# Patient Record
Sex: MALE | Race: WHITE | NOT HISPANIC OR LATINO | Employment: FULL TIME | URBAN - METROPOLITAN AREA
[De-identification: names, ages, dates, MRNs, and addresses within clinical notes are randomized per-mention and may not be internally consistent; named-entity substitution may affect disease eponyms.]

---

## 2017-02-23 ENCOUNTER — HOSPITAL ENCOUNTER (OUTPATIENT)
Dept: SLEEP MEDICINE | Facility: HOSPITAL | Age: 32
Discharge: HOME | End: 2017-02-23
Attending: NURSE PRACTITIONER
Payer: COMMERCIAL

## 2017-02-23 PROCEDURE — 95810 POLYSOM 6/> YRS 4/> PARAM: CPT

## 2017-03-13 ENCOUNTER — HOSPITAL ENCOUNTER (OUTPATIENT)
Dept: SLEEP MEDICINE | Facility: HOSPITAL | Age: 32
Discharge: HOME | End: 2017-03-13
Attending: NURSE PRACTITIONER
Payer: COMMERCIAL

## 2017-03-13 PROCEDURE — 95811 POLYSOM 6/>YRS CPAP 4/> PARM: CPT

## 2018-10-25 ENCOUNTER — HOSPITAL ENCOUNTER (OUTPATIENT)
Dept: SLEEP MEDICINE | Facility: HOSPITAL | Age: 33
Discharge: HOME | End: 2018-10-25
Attending: NURSE PRACTITIONER
Payer: COMMERCIAL

## 2018-10-25 DIAGNOSIS — G47.33 OBSTRUCTIVE SLEEP APNEA (ADULT) (PEDIATRIC): ICD-10-CM

## 2018-10-25 PROCEDURE — 95811 POLYSOM 6/>YRS CPAP 4/> PARM: CPT

## 2018-10-25 PROCEDURE — 95811 POLYSOM 6/>YRS CPAP 4/> PARM: CPT | Mod: 26,,, | Performed by: INTERNAL MEDICINE

## 2021-04-29 ENCOUNTER — HOSPITAL ENCOUNTER (OUTPATIENT)
Dept: RADIOLOGY | Facility: HOSPITAL | Age: 36
Discharge: HOME | End: 2021-04-29
Attending: PAIN MEDICINE
Payer: COMMERCIAL

## 2021-04-29 DIAGNOSIS — M16.0 ARTHRITIS OF BOTH HIPS: ICD-10-CM

## 2021-04-29 DIAGNOSIS — M54.16 LUMBAR RADICULOPATHY: ICD-10-CM

## 2021-04-29 DIAGNOSIS — M87.850 OTHER OSTEONECROSIS, PELVIS: ICD-10-CM

## 2021-04-29 PROCEDURE — 72100 X-RAY EXAM L-S SPINE 2/3 VWS: CPT | Mod: TC

## 2021-04-29 PROCEDURE — 73521 XR HIPS BILATERAL 2 VIEW INCL AP PELVIS: ICD-10-PCS | Mod: 26,,, | Performed by: RADIOLOGY

## 2021-04-29 PROCEDURE — 73521 X-RAY EXAM HIPS BI 2 VIEWS: CPT | Mod: 26,,, | Performed by: RADIOLOGY

## 2021-04-29 PROCEDURE — 72100 X-RAY EXAM L-S SPINE 2/3 VWS: CPT | Mod: 26,,, | Performed by: RADIOLOGY

## 2021-04-29 PROCEDURE — 72100 XR LUMBAR SPINE AP AND LATERAL: ICD-10-PCS | Mod: 26,,, | Performed by: RADIOLOGY

## 2021-04-29 PROCEDURE — 73521 X-RAY EXAM HIPS BI 2 VIEWS: CPT | Mod: TC

## 2022-03-21 ENCOUNTER — HOSPITAL ENCOUNTER (INPATIENT)
Facility: HOSPITAL | Age: 37
LOS: 6 days | Discharge: HOME | DRG: 603 | End: 2022-03-27
Attending: STUDENT IN AN ORGANIZED HEALTH CARE EDUCATION/TRAINING PROGRAM | Admitting: INTERNAL MEDICINE
Payer: COMMERCIAL

## 2022-03-21 DIAGNOSIS — I47.29 NSVT (NONSUSTAINED VENTRICULAR TACHYCARDIA): ICD-10-CM

## 2022-03-21 DIAGNOSIS — A41.9 SEPSIS: ICD-10-CM

## 2022-03-21 DIAGNOSIS — R50.9 FEVER, UNSPECIFIED FEVER CAUSE: ICD-10-CM

## 2022-03-21 DIAGNOSIS — M79.89 RIGHT LEG SWELLING: ICD-10-CM

## 2022-03-21 DIAGNOSIS — L03.90 CELLULITIS: Primary | ICD-10-CM

## 2022-03-21 DIAGNOSIS — D72.829 LEUKOCYTOSIS, UNSPECIFIED TYPE: ICD-10-CM

## 2022-03-21 DIAGNOSIS — L03.115 CELLULITIS OF RIGHT LOWER LEG: ICD-10-CM

## 2022-03-21 LAB
ALBUMIN SERPL BCP-MCNC: 3.5 G/DL (ref 3.5–5.2)
ALP SERPL-CCNC: 70 U/L (ref 55–135)
ALT SERPL W/O P-5'-P-CCNC: 31 U/L (ref 10–44)
ANION GAP SERPL CALC-SCNC: 12 MMOL/L (ref 8–16)
AST SERPL-CCNC: 20 U/L (ref 10–40)
BASOPHILS # BLD AUTO: 0.02 K/UL (ref 0–0.2)
BASOPHILS NFR BLD: 0.1 % (ref 0–1.9)
BILIRUB SERPL-MCNC: 3.2 MG/DL (ref 0.1–1)
BUN SERPL-MCNC: 10 MG/DL (ref 6–20)
CALCIUM SERPL-MCNC: 8.9 MG/DL (ref 8.7–10.5)
CHLORIDE SERPL-SCNC: 99 MMOL/L (ref 95–110)
CO2 SERPL-SCNC: 24 MMOL/L (ref 23–29)
CREAT SERPL-MCNC: 1 MG/DL (ref 0.5–1.4)
DIFFERENTIAL METHOD: ABNORMAL
EOSINOPHIL # BLD AUTO: 0 K/UL (ref 0–0.5)
EOSINOPHIL NFR BLD: 0 % (ref 0–8)
ERYTHROCYTE [DISTWIDTH] IN BLOOD BY AUTOMATED COUNT: 13.4 % (ref 11.5–14.5)
EST. GFR  (AFRICAN AMERICAN): >60 ML/MIN/1.73 M^2
EST. GFR  (NON AFRICAN AMERICAN): >60 ML/MIN/1.73 M^2
ESTIMATED AVG GLUCOSE: 137 MG/DL (ref 68–131)
GLUCOSE SERPL-MCNC: 199 MG/DL (ref 70–110)
HBA1C MFR BLD: 6.4 % (ref 4–5.6)
HCT VFR BLD AUTO: 42.2 % (ref 40–54)
HGB BLD-MCNC: 13.4 G/DL (ref 14–18)
IMM GRANULOCYTES # BLD AUTO: 0.08 K/UL (ref 0–0.04)
IMM GRANULOCYTES NFR BLD AUTO: 0.5 % (ref 0–0.5)
LACTATE SERPL-SCNC: 1.2 MMOL/L (ref 0.5–2.2)
LACTATE SERPL-SCNC: 1.9 MMOL/L (ref 0.5–2.2)
LYMPHOCYTES # BLD AUTO: 1.1 K/UL (ref 1–4.8)
LYMPHOCYTES NFR BLD: 6.4 % (ref 18–48)
MCH RBC QN AUTO: 27.6 PG (ref 27–31)
MCHC RBC AUTO-ENTMCNC: 31.8 G/DL (ref 32–36)
MCV RBC AUTO: 87 FL (ref 82–98)
MONOCYTES # BLD AUTO: 0.8 K/UL (ref 0.3–1)
MONOCYTES NFR BLD: 4.5 % (ref 4–15)
NEUTROPHILS # BLD AUTO: 15.5 K/UL (ref 1.8–7.7)
NEUTROPHILS NFR BLD: 88.5 % (ref 38–73)
NRBC BLD-RTO: 0 /100 WBC
PLATELET # BLD AUTO: 203 K/UL (ref 150–450)
PMV BLD AUTO: 10.3 FL (ref 9.2–12.9)
POCT GLUCOSE: 122 MG/DL (ref 70–110)
POCT GLUCOSE: 127 MG/DL (ref 70–110)
POTASSIUM SERPL-SCNC: 3.7 MMOL/L (ref 3.5–5.1)
PROCALCITONIN SERPL IA-MCNC: 1.44 NG/ML
PROT SERPL-MCNC: 7.5 G/DL (ref 6–8.4)
RBC # BLD AUTO: 4.85 M/UL (ref 4.6–6.2)
SARS-COV-2 RDRP RESP QL NAA+PROBE: NEGATIVE
SODIUM SERPL-SCNC: 135 MMOL/L (ref 136–145)
WBC # BLD AUTO: 17.5 K/UL (ref 3.9–12.7)

## 2022-03-21 PROCEDURE — 87040 BLOOD CULTURE FOR BACTERIA: CPT | Performed by: STUDENT IN AN ORGANIZED HEALTH CARE EDUCATION/TRAINING PROGRAM

## 2022-03-21 PROCEDURE — 99291 CRITICAL CARE FIRST HOUR: CPT | Mod: 25

## 2022-03-21 PROCEDURE — 93005 ELECTROCARDIOGRAM TRACING: CPT

## 2022-03-21 PROCEDURE — 83036 HEMOGLOBIN GLYCOSYLATED A1C: CPT | Performed by: STUDENT IN AN ORGANIZED HEALTH CARE EDUCATION/TRAINING PROGRAM

## 2022-03-21 PROCEDURE — 96365 THER/PROPH/DIAG IV INF INIT: CPT

## 2022-03-21 PROCEDURE — G0378 HOSPITAL OBSERVATION PER HR: HCPCS

## 2022-03-21 PROCEDURE — 85025 COMPLETE CBC W/AUTO DIFF WBC: CPT | Performed by: STUDENT IN AN ORGANIZED HEALTH CARE EDUCATION/TRAINING PROGRAM

## 2022-03-21 PROCEDURE — 63600175 PHARM REV CODE 636 W HCPCS: Performed by: STUDENT IN AN ORGANIZED HEALTH CARE EDUCATION/TRAINING PROGRAM

## 2022-03-21 PROCEDURE — U0002 COVID-19 LAB TEST NON-CDC: HCPCS | Performed by: STUDENT IN AN ORGANIZED HEALTH CARE EDUCATION/TRAINING PROGRAM

## 2022-03-21 PROCEDURE — 25000003 PHARM REV CODE 250: Performed by: STUDENT IN AN ORGANIZED HEALTH CARE EDUCATION/TRAINING PROGRAM

## 2022-03-21 PROCEDURE — 94760 N-INVAS EAR/PLS OXIMETRY 1: CPT

## 2022-03-21 PROCEDURE — 93010 EKG 12-LEAD: ICD-10-PCS | Mod: ,,, | Performed by: INTERNAL MEDICINE

## 2022-03-21 PROCEDURE — 36415 COLL VENOUS BLD VENIPUNCTURE: CPT | Performed by: STUDENT IN AN ORGANIZED HEALTH CARE EDUCATION/TRAINING PROGRAM

## 2022-03-21 PROCEDURE — 80053 COMPREHEN METABOLIC PANEL: CPT | Performed by: STUDENT IN AN ORGANIZED HEALTH CARE EDUCATION/TRAINING PROGRAM

## 2022-03-21 PROCEDURE — 84145 PROCALCITONIN (PCT): CPT | Performed by: STUDENT IN AN ORGANIZED HEALTH CARE EDUCATION/TRAINING PROGRAM

## 2022-03-21 PROCEDURE — 96375 TX/PRO/DX INJ NEW DRUG ADDON: CPT

## 2022-03-21 PROCEDURE — 93010 ELECTROCARDIOGRAM REPORT: CPT | Mod: ,,, | Performed by: INTERNAL MEDICINE

## 2022-03-21 PROCEDURE — 83605 ASSAY OF LACTIC ACID: CPT | Mod: 91 | Performed by: STUDENT IN AN ORGANIZED HEALTH CARE EDUCATION/TRAINING PROGRAM

## 2022-03-21 RX ORDER — SODIUM CHLORIDE 0.9 % (FLUSH) 0.9 %
10 SYRINGE (ML) INJECTION
Status: DISCONTINUED | OUTPATIENT
Start: 2022-03-21 | End: 2022-03-27 | Stop reason: HOSPADM

## 2022-03-21 RX ORDER — ASPIRIN 325 MG
325 TABLET ORAL DAILY
COMMUNITY

## 2022-03-21 RX ORDER — METFORMIN HYDROCHLORIDE 1000 MG/1
1000 TABLET ORAL NIGHTLY
COMMUNITY

## 2022-03-21 RX ORDER — VANCOMYCIN 2 GRAM/500 ML IN 0.9 % SODIUM CHLORIDE INTRAVENOUS
2000
Status: DISCONTINUED | OUTPATIENT
Start: 2022-03-22 | End: 2022-03-23

## 2022-03-21 RX ORDER — OXYCODONE HYDROCHLORIDE 5 MG/1
5 TABLET ORAL EVERY 4 HOURS PRN
Status: DISCONTINUED | OUTPATIENT
Start: 2022-03-21 | End: 2022-03-27 | Stop reason: HOSPADM

## 2022-03-21 RX ORDER — MORPHINE SULFATE 2 MG/ML
6 INJECTION, SOLUTION INTRAMUSCULAR; INTRAVENOUS
Status: COMPLETED | OUTPATIENT
Start: 2022-03-21 | End: 2022-03-21

## 2022-03-21 RX ORDER — ACETAMINOPHEN 325 MG/1
650 TABLET ORAL
Status: COMPLETED | OUTPATIENT
Start: 2022-03-21 | End: 2022-03-21

## 2022-03-21 RX ORDER — ONDANSETRON 8 MG/1
8 TABLET, ORALLY DISINTEGRATING ORAL EVERY 8 HOURS PRN
Status: DISCONTINUED | OUTPATIENT
Start: 2022-03-21 | End: 2022-03-27 | Stop reason: HOSPADM

## 2022-03-21 RX ORDER — ONDANSETRON 2 MG/ML
4 INJECTION INTRAMUSCULAR; INTRAVENOUS
Status: COMPLETED | OUTPATIENT
Start: 2022-03-21 | End: 2022-03-21

## 2022-03-21 RX ORDER — ROSUVASTATIN CALCIUM 10 MG/1
10 TABLET, COATED ORAL DAILY
COMMUNITY

## 2022-03-21 RX ORDER — GLUCAGON 1 MG
1 KIT INJECTION
Status: DISCONTINUED | OUTPATIENT
Start: 2022-03-21 | End: 2022-03-27 | Stop reason: HOSPADM

## 2022-03-21 RX ORDER — ACETAMINOPHEN 325 MG/1
650 TABLET ORAL EVERY 8 HOURS PRN
Status: DISCONTINUED | OUTPATIENT
Start: 2022-03-21 | End: 2022-03-27 | Stop reason: HOSPADM

## 2022-03-21 RX ORDER — LISINOPRIL 20 MG/1
20 TABLET ORAL DAILY
Status: DISCONTINUED | OUTPATIENT
Start: 2022-03-21 | End: 2022-03-27 | Stop reason: HOSPADM

## 2022-03-21 RX ORDER — IBUPROFEN 600 MG/1
600 TABLET ORAL
Status: COMPLETED | OUTPATIENT
Start: 2022-03-21 | End: 2022-03-21

## 2022-03-21 RX ORDER — TALC
6 POWDER (GRAM) TOPICAL NIGHTLY PRN
Status: DISCONTINUED | OUTPATIENT
Start: 2022-03-21 | End: 2022-03-27 | Stop reason: HOSPADM

## 2022-03-21 RX ORDER — ATORVASTATIN CALCIUM 40 MG/1
40 TABLET, FILM COATED ORAL DAILY
Status: DISCONTINUED | OUTPATIENT
Start: 2022-03-21 | End: 2022-03-27 | Stop reason: HOSPADM

## 2022-03-21 RX ORDER — IBUPROFEN 200 MG
24 TABLET ORAL
Status: DISCONTINUED | OUTPATIENT
Start: 2022-03-21 | End: 2022-03-27 | Stop reason: HOSPADM

## 2022-03-21 RX ORDER — IBUPROFEN 200 MG
16 TABLET ORAL
Status: DISCONTINUED | OUTPATIENT
Start: 2022-03-21 | End: 2022-03-27 | Stop reason: HOSPADM

## 2022-03-21 RX ORDER — CLINDAMYCIN PHOSPHATE 900 MG/50ML
900 INJECTION, SOLUTION INTRAVENOUS
Status: COMPLETED | OUTPATIENT
Start: 2022-03-21 | End: 2022-03-22

## 2022-03-21 RX ORDER — LISINOPRIL 20 MG/1
20 TABLET ORAL DAILY
COMMUNITY

## 2022-03-21 RX ORDER — VANCOMYCIN 2 GRAM/500 ML IN 0.9 % SODIUM CHLORIDE INTRAVENOUS
2000 ONCE
Status: COMPLETED | OUTPATIENT
Start: 2022-03-21 | End: 2022-03-21

## 2022-03-21 RX ORDER — INSULIN ASPART 100 [IU]/ML
1-10 INJECTION, SOLUTION INTRAVENOUS; SUBCUTANEOUS
Status: DISCONTINUED | OUTPATIENT
Start: 2022-03-21 | End: 2022-03-27 | Stop reason: HOSPADM

## 2022-03-21 RX ADMIN — SODIUM CHLORIDE 1000 ML: 0.9 INJECTION, SOLUTION INTRAVENOUS at 11:03

## 2022-03-21 RX ADMIN — ATORVASTATIN CALCIUM 40 MG: 40 TABLET, FILM COATED ORAL at 04:03

## 2022-03-21 RX ADMIN — CLINDAMYCIN IN 5 PERCENT DEXTROSE 900 MG: 18 INJECTION, SOLUTION INTRAVENOUS at 06:03

## 2022-03-21 RX ADMIN — ACETAMINOPHEN 650 MG: 325 TABLET ORAL at 10:03

## 2022-03-21 RX ADMIN — MORPHINE SULFATE 6 MG: 2 INJECTION, SOLUTION INTRAMUSCULAR; INTRAVENOUS at 11:03

## 2022-03-21 RX ADMIN — VANCOMYCIN 2 GRAM/500 ML IN 0.9 % SODIUM CHLORIDE INTRAVENOUS 2000 MG: at 12:03

## 2022-03-21 RX ADMIN — ONDANSETRON HYDROCHLORIDE 4 MG: 2 SOLUTION INTRAMUSCULAR; INTRAVENOUS at 11:03

## 2022-03-21 RX ADMIN — IBUPROFEN 600 MG: 600 TABLET ORAL at 10:03

## 2022-03-21 RX ADMIN — PIPERACILLIN AND TAZOBACTAM 4.5 G: 4; .5 INJECTION, POWDER, LYOPHILIZED, FOR SOLUTION INTRAVENOUS; PARENTERAL at 04:03

## 2022-03-21 RX ADMIN — LISINOPRIL 20 MG: 20 TABLET ORAL at 04:03

## 2022-03-21 RX ADMIN — CLINDAMYCIN IN 5 PERCENT DEXTROSE 900 MG: 18 INJECTION, SOLUTION INTRAVENOUS at 11:03

## 2022-03-21 NOTE — PLAN OF CARE
Problem: Adult Inpatient Plan of Care  Goal: Plan of Care Review  Outcome: Ongoing, Progressing  Goal: Patient-Specific Goal (Individualized)  Outcome: Ongoing, Progressing  Goal: Absence of Hospital-Acquired Illness or Injury  Outcome: Ongoing, Progressing  Goal: Optimal Comfort and Wellbeing  Outcome: Ongoing, Progressing  Goal: Readiness for Transition of Care  Outcome: Ongoing, Progressing     Problem: Diabetes Comorbidity  Goal: Blood Glucose Level Within Targeted Range  Outcome: Ongoing, Progressing     Problem: Pain Acute  Goal: Acceptable Pain Control and Functional Ability  Outcome: Ongoing, Progressing     Problem: Fall Injury Risk  Goal: Absence of Fall and Fall-Related Injury  Outcome: Ongoing, Progressing     Problem: Infection  Goal: Absence of Infection Signs and Symptoms  Outcome: Ongoing, Progressing

## 2022-03-21 NOTE — PROGRESS NOTES
Pharmacokinetic Initial Assessment: IV Vancomycin    Assessment/Plan:    Patient received 2g in the ED per sepsis protocol then pharmacy to dose consult was ordered upon admit followed by a maintenance dose of vancomycin 2000mg IV every 12 hours per protocol  Desired empiric serum trough concentration is 10 to 20 mcg/mL  Draw vancomycin trough before 4th dose on 3/22/22 @ 2330  Pharmacy will continue to follow and monitor vancomycin.      Please contact pharmacy at extension [phone] with any questions regarding this assessment.     Thank you for the consult,   Rosa Holley       Patient brief summary:  Luis Carlos Rueda is a 37 y.o. male initiated on antimicrobial therapy with IV Vancomycin for treatment of suspected skin & soft tissue infection    Drug Allergies:   Review of patient's allergies indicates:   Allergen Reactions    Bactrim [sulfamethoxazole-trimethoprim]        Actual Body Weight:   230.7kg    Renal Function:   Estimated Creatinine Clearance: 200.6 mL/min (based on SCr of 1 mg/dL).,     Dialysis Method (if applicable):  N/A    CBC (last 72 hours):  Recent Labs   Lab Result Units 03/21/22  1100   WBC K/uL 17.50*   Hemoglobin g/dL 13.4*   Hematocrit % 42.2   Platelets K/uL 203   Gran % % 88.5*   Lymph % % 6.4*   Mono % % 4.5   Eosinophil % % 0.0   Basophil % % 0.1   Differential Method  Automated       Metabolic Panel (last 72 hours):  Recent Labs   Lab Result Units 03/21/22  1100   Sodium mmol/L 135*   Potassium mmol/L 3.7   Chloride mmol/L 99   CO2 mmol/L 24   Glucose mg/dL 199*   BUN mg/dL 10   Creatinine mg/dL 1.0   Albumin g/dL 3.5   Total Bilirubin mg/dL 3.2*   Alkaline Phosphatase U/L 70   AST U/L 20   ALT U/L 31       Drug levels (last 3 results):  No results for input(s): VANCOMYCINRA, VANCOMYCINPE, VANCOMYCINTR in the last 72 hours.    Microbiologic Results:  Microbiology Results (last 7 days)     Procedure Component Value Units Date/Time    Blood culture x two cultures. Draw prior to  antibiotics. [[id number]] Collected: 03/21/22 1127    Order Status: Sent Specimen: Blood Updated: 03/21/22 1424    Blood culture x two cultures. Draw prior to antibiotics. [[id number]] Collected: 03/21/22 1100    Order Status: Sent Specimen: Blood from Antecubital, Right Updated: 03/21/22 1420

## 2022-03-21 NOTE — ED PROVIDER NOTES
Encounter Date: 3/21/2022    This document was partially completed using speech recognition software and may contain misspellings, grammatical errors, and/or unexpected word substitutions.       History     Chief Complaint   Patient presents with    Leg Swelling     C/o swelling and redness ro right leg. Patient reports noticed a cut on his right lower leg Friday, but does not know what he cut it on.     37 year old male with a PMHx of DM, HTN presents to the ED with his wife for right lower leg redness, pain, fever. Noticed a small wound on the mid-anterior shin on Friday. Initially started as a fist sized redness but not spread to most of his lower leg. Works from home so no wounds, punctures, or known trauma. No exposure to fresh or salt water.    Meds:  Crestor 10mg QD  Lisinopril 20mg QD  Metformin 500mg BID        Review of patient's allergies indicates:   Allergen Reactions    Bactrim [sulfamethoxazole-trimethoprim]      Past Medical History:   Diagnosis Date    Diabetes mellitus     Hypertension     Psoriasis     Sleep apnea, unspecified      History reviewed. No pertinent surgical history.  History reviewed. No pertinent family history.  Social History     Tobacco Use    Smoking status: Current Every Day Smoker     Types: Cigarettes    Smokeless tobacco: Never Used   Substance Use Topics    Alcohol use: Yes     Review of Systems   Constitutional: Positive for fever. Negative for chills.   HENT: Negative for congestion, rhinorrhea and sneezing.    Eyes: Negative for discharge and redness.   Respiratory: Negative for cough and shortness of breath.    Cardiovascular: Negative for chest pain and palpitations.   Gastrointestinal: Negative for abdominal pain, diarrhea and vomiting.   Genitourinary: Negative for difficulty urinating, flank pain and urgency.   Musculoskeletal: Negative for back pain and neck pain.   Skin: Positive for color change. Negative for rash and wound.   Neurological: Negative for  weakness, numbness and headaches.       Physical Exam     Initial Vitals [03/21/22 1022]   BP Pulse Resp Temp SpO2   (!) 142/80 (!) 111 20 (!) 103.1 °F (39.5 °C) 98 %      MAP       --         Physical Exam    Nursing note and vitals reviewed.  Constitutional: He appears well-developed. He is not diaphoretic. He is Obese . No distress.   HENT:   Head: Normocephalic and atraumatic.   Right Ear: External ear normal.   Left Ear: External ear normal.   Nose: Nose normal.   Eyes: Conjunctivae are normal. Right eye exhibits no discharge. Left eye exhibits no discharge. No scleral icterus.   Cardiovascular: Regular rhythm. Tachycardia present.    Pulmonary/Chest: Breath sounds normal. No stridor. No respiratory distress. He has no wheezes. He has no rhonchi. He has no rales.   Abdominal: Abdomen is soft. He exhibits no distension. There is no abdominal tenderness. There is no guarding.   Musculoskeletal:         General: No edema.     Neurological: He is alert and oriented to person, place, and time. GCS score is 15. GCS eye subscore is 4. GCS verbal subscore is 5. GCS motor subscore is 6.   Skin: Skin is warm and dry. Capillary refill takes less than 2 seconds.        Psychiatric: He has a normal mood and affect.                         ED Course   Critical Care    Date/Time: 3/21/2022 12:20 PM  Performed by: Geoffrey Márquez DO  Authorized by: Geoffrey Márquez DO   Direct patient critical care time: 20 minutes  Additional history critical care time: 3 minutes  Ordering / reviewing critical care time: 4 minutes  Documentation critical care time: 3 minutes  Consulting other physicians critical care time: 3 minutes  Consult with family critical care time: 2 minutes  Total critical care time (exclusive of procedural time) : 35 minutes  Critical care time was exclusive of separately billable procedures and treating other patients and teaching time.  Critical care was necessary to treat or prevent imminent or life-threatening  deterioration of the following conditions: sepsis.  Critical care was time spent personally by me on the following activities: discussions with consultants, evaluation of patient's response to treatment, obtaining history from patient or surrogate, ordering and review of laboratory studies, re-evaluation of patient's condition, ordering and review of radiographic studies, ordering and performing treatments and interventions, examination of patient and development of treatment plan with patient or surrogate.        Labs Reviewed   CBC W/ AUTO DIFFERENTIAL - Abnormal; Notable for the following components:       Result Value    WBC 17.50 (*)     Hemoglobin 13.4 (*)     MCHC 31.8 (*)     Gran # (ANC) 15.5 (*)     Immature Grans (Abs) 0.08 (*)     Gran % 88.5 (*)     Lymph % 6.4 (*)     All other components within normal limits   COMPREHENSIVE METABOLIC PANEL - Abnormal; Notable for the following components:    Sodium 135 (*)     Glucose 199 (*)     Total Bilirubin 3.2 (*)     All other components within normal limits   PROCALCITONIN - Abnormal; Notable for the following components:    Procalcitonin 1.44 (*)     All other components within normal limits   CULTURE, BLOOD   CULTURE, BLOOD   LACTIC ACID, PLASMA   SARS-COV-2 RNA AMPLIFICATION, QUAL   LACTIC ACID, PLASMA   URINALYSIS, REFLEX TO URINE CULTURE     EKG Readings: (Independently Interpreted)   Sinus tach at 106 bpm. Normal axis. Normal intervals. No VICKY/STD. No STEMI.       Imaging Results          X-Ray Tibia Fibula 2 View Right (Final result)  Result time 03/21/22 11:34:17    Final result by Win Pierre MD (03/21/22 11:34:17)                 Impression:      As above.      Electronically signed by: Win Pierre MD  Date:    03/21/2022  Time:    11:34             Narrative:    EXAMINATION:  XR TIBIA FIBULA 2 VIEW RIGHT    CLINICAL HISTORY:  .  Cellulitis, unspecified    TECHNIQUE:  Right tibia and fibula dated March 21, 2022.    COMPARISON:  No prior  study for comparison.    FINDINGS:  Soft tissue swelling.  Vascular calcifications.  No evidence of a radiopaque foreign body.    There is no evidence of fracture, dislocation or other acute osseous abnormality.                               X-Ray Chest AP Portable (Final result)  Result time 03/21/22 11:32:15    Final result by Win Pierre MD (03/21/22 11:32:15)                 Impression:      No active lung disease.      Electronically signed by: Win Pierre MD  Date:    03/21/2022  Time:    11:32             Narrative:    EXAMINATION:  XR CHEST AP PORTABLE    CLINICAL HISTORY:  Sepsis;.    TECHNIQUE:  Portable chest dated March 21, 2022.    COMPARISON:  No prior study for comparison.    FINDINGS:  The cardiac silhouette is within normal limits considering portable technique.  No focal infiltrate or effusion.  No acute osseous abnormality.                                 Medications   vancomycin 2 g in 0.9% sodium chloride 500 mL IVPB (2,000 mg Intravenous New Bag 3/21/22 1223)   clindamycin in D5W 900 mg/50 mL IVPB 900 mg (0 mg Intravenous Stopped 3/21/22 1213)   ibuprofen tablet 600 mg (600 mg Oral Given 3/21/22 1045)   acetaminophen tablet 650 mg (650 mg Oral Given 3/21/22 1045)   sodium chloride 0.9% bolus 1,000 mL (1,000 mLs Intravenous New Bag 3/21/22 1113)   ondansetron injection 4 mg (4 mg Intravenous Given 3/21/22 1113)   morphine injection 6 mg (6 mg Intravenous Given 3/21/22 1113)     Medical Decision Making:   Differential Diagnosis:   DDx: cellulitis, nec fasc, sepsis, abscess  ED Management:  Based on the patient's evaluation - patient appears to need admission for sepsis due to right leg cellulitis. No concerns for nec fasc - no crepitus, no free air on Xray imaging. Blood cultures, lactic, broad spectrum antibiotics given. Tdap within 5 years per patient. Will need admission. Spoke with Dr. Harden who was happy to assist and is in agreement with the plan.                       Clinical  Impression:   Final diagnoses:  [A41.9] Sepsis  [M79.89] Right leg swelling  [L03.90] Cellulitis (Primary)  [R50.9] Fever, unspecified fever cause  [D72.829] Leukocytosis, unspecified type          ED Disposition Condition    Admit               Hale DO Yulisa  03/21/22 1240       Geoffrey Márquez DO  03/21/22 1331

## 2022-03-22 PROBLEM — E66.01 MORBID OBESITY: Status: ACTIVE | Noted: 2022-03-22

## 2022-03-22 PROBLEM — I10 PRIMARY HYPERTENSION: Status: ACTIVE | Noted: 2022-03-22

## 2022-03-22 PROBLEM — E87.6 HYPOKALEMIA: Status: ACTIVE | Noted: 2022-03-22

## 2022-03-22 PROBLEM — L03.115 CELLULITIS OF RIGHT LOWER LEG: Status: ACTIVE | Noted: 2022-03-22

## 2022-03-22 PROBLEM — E11.9 TYPE 2 DIABETES MELLITUS WITHOUT COMPLICATION, WITHOUT LONG-TERM CURRENT USE OF INSULIN: Status: ACTIVE | Noted: 2022-03-22

## 2022-03-22 LAB
ALBUMIN SERPL BCP-MCNC: 2.8 G/DL (ref 3.5–5.2)
ALP SERPL-CCNC: 62 U/L (ref 55–135)
ALT SERPL W/O P-5'-P-CCNC: 26 U/L (ref 10–44)
ANION GAP SERPL CALC-SCNC: 11 MMOL/L (ref 8–16)
AST SERPL-CCNC: 22 U/L (ref 10–40)
BASOPHILS # BLD AUTO: 0.02 K/UL (ref 0–0.2)
BASOPHILS NFR BLD: 0.2 % (ref 0–1.9)
BILIRUB SERPL-MCNC: 3 MG/DL (ref 0.1–1)
BUN SERPL-MCNC: 9 MG/DL (ref 6–20)
CALCIUM SERPL-MCNC: 8.4 MG/DL (ref 8.7–10.5)
CHLORIDE SERPL-SCNC: 105 MMOL/L (ref 95–110)
CO2 SERPL-SCNC: 21 MMOL/L (ref 23–29)
CREAT SERPL-MCNC: 0.8 MG/DL (ref 0.5–1.4)
DIFFERENTIAL METHOD: ABNORMAL
EOSINOPHIL # BLD AUTO: 0.1 K/UL (ref 0–0.5)
EOSINOPHIL NFR BLD: 0.6 % (ref 0–8)
ERYTHROCYTE [DISTWIDTH] IN BLOOD BY AUTOMATED COUNT: 13.7 % (ref 11.5–14.5)
EST. GFR  (AFRICAN AMERICAN): >60 ML/MIN/1.73 M^2
EST. GFR  (NON AFRICAN AMERICAN): >60 ML/MIN/1.73 M^2
GLUCOSE SERPL-MCNC: 149 MG/DL (ref 70–110)
HCT VFR BLD AUTO: 40.5 % (ref 40–54)
HGB BLD-MCNC: 12.4 G/DL (ref 14–18)
IMM GRANULOCYTES # BLD AUTO: 0.04 K/UL (ref 0–0.04)
IMM GRANULOCYTES NFR BLD AUTO: 0.3 % (ref 0–0.5)
LYMPHOCYTES # BLD AUTO: 1.6 K/UL (ref 1–4.8)
LYMPHOCYTES NFR BLD: 12.6 % (ref 18–48)
MCH RBC QN AUTO: 27.9 PG (ref 27–31)
MCHC RBC AUTO-ENTMCNC: 30.6 G/DL (ref 32–36)
MCV RBC AUTO: 91 FL (ref 82–98)
MONOCYTES # BLD AUTO: 0.8 K/UL (ref 0.3–1)
MONOCYTES NFR BLD: 6 % (ref 4–15)
NEUTROPHILS # BLD AUTO: 10 K/UL (ref 1.8–7.7)
NEUTROPHILS NFR BLD: 80.3 % (ref 38–73)
NRBC BLD-RTO: 0 /100 WBC
PLATELET # BLD AUTO: 127 K/UL (ref 150–450)
PMV BLD AUTO: 11.1 FL (ref 9.2–12.9)
POCT GLUCOSE: 142 MG/DL (ref 70–110)
POCT GLUCOSE: 148 MG/DL (ref 70–110)
POCT GLUCOSE: 155 MG/DL (ref 70–110)
POCT GLUCOSE: 157 MG/DL (ref 70–110)
POTASSIUM SERPL-SCNC: 3.3 MMOL/L (ref 3.5–5.1)
PROT SERPL-MCNC: 6.6 G/DL (ref 6–8.4)
RBC # BLD AUTO: 4.44 M/UL (ref 4.6–6.2)
SODIUM SERPL-SCNC: 137 MMOL/L (ref 136–145)
VANCOMYCIN TROUGH SERPL-MCNC: 8.5 UG/ML (ref 10–22)
WBC # BLD AUTO: 12.42 K/UL (ref 3.9–12.7)

## 2022-03-22 PROCEDURE — 63600175 PHARM REV CODE 636 W HCPCS: Performed by: STUDENT IN AN ORGANIZED HEALTH CARE EDUCATION/TRAINING PROGRAM

## 2022-03-22 PROCEDURE — 36415 COLL VENOUS BLD VENIPUNCTURE: CPT | Performed by: STUDENT IN AN ORGANIZED HEALTH CARE EDUCATION/TRAINING PROGRAM

## 2022-03-22 PROCEDURE — 25000003 PHARM REV CODE 250: Performed by: NURSE PRACTITIONER

## 2022-03-22 PROCEDURE — 25000003 PHARM REV CODE 250: Performed by: INTERNAL MEDICINE

## 2022-03-22 PROCEDURE — 99223 PR INITIAL HOSPITAL CARE,LEVL III: ICD-10-PCS | Mod: ,,, | Performed by: INTERNAL MEDICINE

## 2022-03-22 PROCEDURE — 80053 COMPREHEN METABOLIC PANEL: CPT | Performed by: STUDENT IN AN ORGANIZED HEALTH CARE EDUCATION/TRAINING PROGRAM

## 2022-03-22 PROCEDURE — 99900035 HC TECH TIME PER 15 MIN (STAT)

## 2022-03-22 PROCEDURE — 99223 1ST HOSP IP/OBS HIGH 75: CPT | Mod: ,,, | Performed by: INTERNAL MEDICINE

## 2022-03-22 PROCEDURE — 36415 COLL VENOUS BLD VENIPUNCTURE: CPT | Performed by: INTERNAL MEDICINE

## 2022-03-22 PROCEDURE — 80202 ASSAY OF VANCOMYCIN: CPT | Performed by: INTERNAL MEDICINE

## 2022-03-22 PROCEDURE — 25000003 PHARM REV CODE 250: Performed by: STUDENT IN AN ORGANIZED HEALTH CARE EDUCATION/TRAINING PROGRAM

## 2022-03-22 PROCEDURE — 85025 COMPLETE CBC W/AUTO DIFF WBC: CPT | Performed by: STUDENT IN AN ORGANIZED HEALTH CARE EDUCATION/TRAINING PROGRAM

## 2022-03-22 PROCEDURE — 94761 N-INVAS EAR/PLS OXIMETRY MLT: CPT

## 2022-03-22 PROCEDURE — 11000001 HC ACUTE MED/SURG PRIVATE ROOM

## 2022-03-22 RX ORDER — ASPIRIN 325 MG
325 TABLET ORAL DAILY
Status: DISCONTINUED | OUTPATIENT
Start: 2022-03-22 | End: 2022-03-27 | Stop reason: HOSPADM

## 2022-03-22 RX ORDER — CLINDAMYCIN PHOSPHATE 900 MG/50ML
900 INJECTION, SOLUTION INTRAVENOUS
Status: DISCONTINUED | OUTPATIENT
Start: 2022-03-22 | End: 2022-03-23

## 2022-03-22 RX ORDER — POTASSIUM CHLORIDE 20 MEQ/1
20 TABLET, EXTENDED RELEASE ORAL EVERY 4 HOURS
Status: COMPLETED | OUTPATIENT
Start: 2022-03-22 | End: 2022-03-22

## 2022-03-22 RX ADMIN — INSULIN ASPART 2 UNITS: 100 INJECTION, SOLUTION INTRAVENOUS; SUBCUTANEOUS at 12:03

## 2022-03-22 RX ADMIN — ASPIRIN 325 MG ORAL TABLET 325 MG: 325 PILL ORAL at 11:03

## 2022-03-22 RX ADMIN — OXYCODONE HYDROCHLORIDE 5 MG: 5 TABLET ORAL at 09:03

## 2022-03-22 RX ADMIN — CLINDAMYCIN IN 5 PERCENT DEXTROSE 900 MG: 18 INJECTION, SOLUTION INTRAVENOUS at 03:03

## 2022-03-22 RX ADMIN — INSULIN ASPART 1 UNITS: 100 INJECTION, SOLUTION INTRAVENOUS; SUBCUTANEOUS at 09:03

## 2022-03-22 RX ADMIN — ATORVASTATIN CALCIUM 40 MG: 40 TABLET, FILM COATED ORAL at 09:03

## 2022-03-22 RX ADMIN — ACETAMINOPHEN 650 MG: 325 TABLET ORAL at 02:03

## 2022-03-22 RX ADMIN — CLINDAMYCIN PHOSPHATE 900 MG: 900 INJECTION, SOLUTION INTRAVENOUS at 11:03

## 2022-03-22 RX ADMIN — POTASSIUM CHLORIDE 20 MEQ: 1500 TABLET, EXTENDED RELEASE ORAL at 01:03

## 2022-03-22 RX ADMIN — POTASSIUM CHLORIDE 20 MEQ: 1500 TABLET, EXTENDED RELEASE ORAL at 09:03

## 2022-03-22 RX ADMIN — LISINOPRIL 20 MG: 20 TABLET ORAL at 09:03

## 2022-03-22 RX ADMIN — MELATONIN TAB 3 MG 6 MG: 3 TAB at 09:03

## 2022-03-22 RX ADMIN — VANCOMYCIN 2 GRAM/500 ML IN 0.9 % SODIUM CHLORIDE INTRAVENOUS 2000 MG: at 12:03

## 2022-03-22 RX ADMIN — VANCOMYCIN 2 GRAM/500 ML IN 0.9 % SODIUM CHLORIDE INTRAVENOUS 2000 MG: at 01:03

## 2022-03-22 RX ADMIN — CLINDAMYCIN PHOSPHATE 900 MG: 900 INJECTION, SOLUTION INTRAVENOUS at 06:03

## 2022-03-22 RX ADMIN — ACETAMINOPHEN 650 MG: 325 TABLET ORAL at 04:03

## 2022-03-22 NOTE — PLAN OF CARE
[facility] - Med Surg (3rd Fl)  Discharge Assessment    Primary Care Provider: Vel Yeung NP     Discharge Assessment (most recent)     BRIEF DISCHARGE ASSESSMENT - 03/22/22 1022        Discharge Planning    Assessment Type Discharge Planning Brief Assessment     Resource/Environmental Concerns none     Support Systems Spouse/significant other;Children     Assistance Needed None     Equipment Currently Used at Home CPAP;glucometer;other (see comments)   BP Cuff    Current Living Arrangements home/apartment/condo     Patient/Family Anticipates Transition to home     Patient/Family Anticipated Services at Transition none     DME Needed Upon Discharge  none     Discharge Plan A Home     Discharge Plan B Home with family                   Discharge assessment completed with patient. Denies any post-acute care needs at this time. SW to remain available.

## 2022-03-22 NOTE — ASSESSMENT & PLAN NOTE
# The patient is asked to make an attempt to improve diet and exercise patterns to aid in medical management of this problem.     # Eat  5 small meals a day.     # Cut out high carbohydrate  foods : bread, rice, pasta, potatoes.     # Exercise/walk 5x/week for at least 30-45  minutes.

## 2022-03-22 NOTE — ASSESSMENT & PLAN NOTE
Day 2 IV Vanc &  clinda ; had 3 rd dose of Clinda this am; will continue   + Fever , blood Cx NGTd  WBC 17.50> 12.42   Bilateral LE US   Repeat labs in am .

## 2022-03-22 NOTE — H&P
[facility] Surg ([address] [place] [address] Medicine  History & Physical    Patient Name: Luis Carlos Rueda  MRN: [id number]  Patient Class: IP- Inpatient  Admission Date: 3/21/2022  Attending Physician: Amy Harden MD   Primary Care Provider: Vel Yeung NP         Patient information was obtained from patient and ER records.     Subjective:     Principal Problem:Cellulitis of right lower leg    Chief Complaint:   Chief Complaint   Patient presents with    Leg Swelling     C/o swelling and redness ro right leg. Patient reports noticed a cut on his right lower leg Friday, but does not know what he cut it on.        HPI: Luis Carlos Rueda is a 37 y.o. male with known Type II DM, HTN, and morbid obesity presented to the ED with his wife for right lower leg redness, pain, fever. He Noticed a small wound on the mid-anterior shin on Friday. Initially started as a fist sized redness but now spread to most of his lower leg. Works from home.. No exposure to fresh or salt water. Noted fever on [place] and presented yesterday am.   Procal 1.44 abnormal     Day 2 IV vanc and Clinda   + Fever tmax 102.7   WBC 17.50> 12.42       Past Medical History:   Diagnosis Date    Diabetes mellitus     Hypertension     Psoriasis     Sleep apnea, unspecified        History reviewed. No pertinent surgical history.    Review of patient's allergies indicates:   Allergen Reactions    Bactrim [sulfamethoxazole-trimethoprim]        No current facility-administered medications on file prior to encounter.     Current Outpatient Medications on File Prior to Encounter   Medication Sig    aspirin 325 MG tablet Take 325 mg by mouth once daily.    lisinopriL (PRINIVIL,ZESTRIL) 20 MG tablet Take 20 mg by mouth once daily.    metFORMIN (GLUCOPHAGE) 1000 MG tablet Take 1,000 mg by mouth every evening.    rosuvastatin (CRESTOR) 10 MG tablet Take 10 mg by mouth once daily.     Family History    None       Tobacco Use    Smoking status: Current Every Day  Smoker     Types: Cigarettes    Smokeless tobacco: Never Used   Substance and Sexual Activity    Alcohol use: Yes    Drug use: Not on file    Sexual activity: Not on file     Review of Systems   Constitutional:  Positive for activity change, fatigue and fever. Negative for chills.   HENT:  Negative for congestion, postnasal drip and sore throat.    Eyes:  Negative for photophobia.   Respiratory:  Negative for chest tightness and shortness of breath.    Cardiovascular:  Negative for chest pain.   Gastrointestinal:  Negative for abdominal distention, abdominal pain, blood in stool and vomiting.   Genitourinary:  Negative for dysuria, flank pain and hematuria.   Musculoskeletal:  Negative for back pain.   Skin:  Positive for color change, rash and wound. Negative for pallor.   Neurological:  Negative for dizziness, seizures, facial asymmetry, speech difficulty and numbness.   Hematological:  Does not bruise/bleed easily.   Psychiatric/Behavioral:  Negative for agitation and suicidal ideas. The patient is not nervous/anxious.    Objective:     Vital Signs (Most Recent):  Temp: 99.1 °F (37.3 °C) (03/22/22 0715)  Pulse: 88 (03/22/22 0715)  Resp: 12 (03/22/22 0715)  BP: 133/65 (03/22/22 0715)  SpO2: 97 % (03/22/22 0722) Vital Signs (24h Range):  Temp:  [97 °F (36.1 °C)-103.1 °F (39.5 °C)] 99.1 °F (37.3 °C)  Pulse:  [] 88  Resp:  [12-20] 12  SpO2:  [95 %-100 %] 97 %  BP: (131-164)/(65-80) 133/65     Weight: (!) 230.7 kg (508 lb 11.4 oz)  Body mass index is 65.31 kg/m².    Physical Exam  Vitals and nursing note reviewed.   Constitutional:       General: He is not in acute distress.     Appearance: He is well-developed. He is obese. He is not ill-appearing, toxic-appearing or diaphoretic.   HENT:      Head: Normocephalic and atraumatic.      Nose: Nose normal.   Eyes:      Extraocular Movements: EOM normal.      Pupils: Pupils are equal, round, and reactive to light.   Cardiovascular:      Rate and Rhythm: Normal  rate and regular rhythm.      Heart sounds: No murmur heard.  Pulmonary:      Effort: Pulmonary effort is normal.      Breath sounds: Normal breath sounds.   Abdominal:      General: Bowel sounds are normal.      Palpations: Abdomen is soft.   Musculoskeletal:         General: Tenderness present. Normal range of motion.      Cervical back: Normal range of motion and neck supple.   Skin:     General: Skin is warm and dry.      Capillary Refill: Capillary refill takes less than 2 seconds.      Findings: Erythema present.   Neurological:      Mental Status: He is alert and oriented to person, place, and time.   Psychiatric:         Behavior: Behavior normal.         Thought Content: Thought content normal.         Judgment: Judgment normal.         CRANIAL NERVES     CN III, IV, VI   Pupils are equal, round, and reactive to light.  Extraocular motions are normal.          Significant Labs: All pertinent labs within the past 24 hours have been reviewed.  A1C:   Recent Labs   Lab 03/21/22  1448   HGBA1C 6.4*     ABGs: No results for input(s): PH, PCO2, HCO3, POCSATURATED, BE, TOTALHB, COHB, METHB, O2HB, POCFIO2, PO2 in the last 48 hours.  Bilirubin:   Recent Labs   Lab 03/21/22  1100 03/22/22  0540   BILITOT 3.2* 3.0*     Blood Culture:   Recent Labs   Lab 03/21/22  1100 03/21/22  1127   LABBLOO No Growth to date No Growth to date     CBC:   Recent Labs   Lab 03/21/22  1100 03/22/22  0540   WBC 17.50* 12.42   HGB 13.4* 12.4*   HCT 42.2 40.5    127*     CMP:   Recent Labs   Lab 03/21/22  1100 03/22/22  0540   * 137   K 3.7 3.3*   CL 99 105   CO2 24 21*   * 149*   BUN 10 9   CREATININE 1.0 0.8   CALCIUM 8.9 8.4*   PROT 7.5 6.6   ALBUMIN 3.5 2.8*   BILITOT 3.2* 3.0*   ALKPHOS 70 62   AST 20 22   ALT 31 26   ANIONGAP 12 11   EGFRNONAA >60 >60     Cardiac Markers: No results for input(s): CKMB, MYOGLOBIN, BNP, TROPISTAT in the last 48 hours.  Lactic Acid:   Recent Labs   Lab 03/21/22  1100 03/21/22  1448    LACTATE 1.9 1.2     Magnesium: No results for input(s): MG in the last 48 hours.  POCT Glucose:   Recent Labs   Lab 03/21/22  1659 03/21/22  1903 03/22/22  0603   POCTGLUCOSE 127* 122* 148*     Troponin: No results for input(s): TROPONINI in the last 48 hours.  TSH: No results for input(s): TSH in the last 4320 hours.  Urine Culture: No results for input(s): LABURIN in the last 48 hours.  Urine Studies: No results for input(s): COLORU, APPEARANCEUA, PHUR, SPECGRAV, PROTEINUA, GLUCUA, KETONESU, BILIRUBINUA, OCCULTUA, NITRITE, UROBILINOGEN, LEUKOCYTESUR, RBCUA, WBCUA, BACTERIA, SQUAMEPITHEL, HYALINECASTS in the last 48 hours.    Invalid input(s): WRIGHTSUR    Significant Imaging: I have reviewed and interpreted all pertinent imaging results/findings within the past 24 hours.    Xray - Right tib  fib-   Soft tissue swelling.  Vascular calcifications.  No evidence of a radiopaque foreign body.     There is no evidence of fracture, dislocation or other acute osseous abnormality.  CXR- 3/21/22  The cardiac silhouette is within normal limits considering portable technique.  No focal infiltrate or effusion.  No acute osseous abnormality.       Assessment/Plan:     * Cellulitis of right lower leg  Day 2 IV Vanc &  clinda ; had 3 rd dose of Clinda this am; will continue   + Fever , blood Cx NGTd  WBC 17.50> 12.42   Bilateral LE US   Repeat labs in am .      Morbid obesity    # The patient is asked to make an attempt to improve diet and exercise patterns to aid in medical management of this problem.     # Eat  5 small meals a day.     # Cut out high carbohydrate  foods : bread, rice, pasta, potatoes.     # Exercise/walk 5x/week for at least 30-45  minutes.              Primary hypertension  Continue lisinopril 20mg daily   BP stable .    Type 2 diabetes mellitus without complication, without long-term current use of insulin  Glucophage 1,000mg q hs at home  Correction scale here  Controlled   Diabetic diet  .      Hypokalemia  Supplement and monitor .      Obstructive sleep apnea (adult) (pediatric)  Has home machine and using at night- continue .        VTE Risk Mitigation (From admission, onward)         Ordered     IP VTE LOW RISK PATIENT  Once         03/21/22 1446     Place sequential compression device  Until discontinued         03/21/22 1446     Place TATY hose  Until discontinued         03/21/22 1446                   Juliette Weaver MD  Department of Hospital Medicine   [facility] - Med Surg (3rd Fl)

## 2022-03-22 NOTE — SUBJECTIVE & OBJECTIVE
Past Medical History:   Diagnosis Date    Diabetes mellitus     Hypertension     Psoriasis     Sleep apnea, unspecified        History reviewed. No pertinent surgical history.    Review of patient's allergies indicates:   Allergen Reactions    Bactrim [sulfamethoxazole-trimethoprim]        No current facility-administered medications on file prior to encounter.     Current Outpatient Medications on File Prior to Encounter   Medication Sig    aspirin 325 MG tablet Take 325 mg by mouth once daily.    lisinopriL (PRINIVIL,ZESTRIL) 20 MG tablet Take 20 mg by mouth once daily.    metFORMIN (GLUCOPHAGE) 1000 MG tablet Take 1,000 mg by mouth every evening.    rosuvastatin (CRESTOR) 10 MG tablet Take 10 mg by mouth once daily.     Family History    None       Tobacco Use    Smoking status: Current Every Day Smoker     Types: Cigarettes    Smokeless tobacco: Never Used   Substance and Sexual Activity    Alcohol use: Yes    Drug use: Not on file    Sexual activity: Not on file     Review of Systems   Constitutional:  Positive for activity change, fatigue and fever. Negative for chills.   HENT:  Negative for congestion, postnasal drip and sore throat.    Eyes:  Negative for photophobia.   Respiratory:  Negative for chest tightness and shortness of breath.    Cardiovascular:  Negative for chest pain.   Gastrointestinal:  Negative for abdominal distention, abdominal pain, blood in stool and vomiting.   Genitourinary:  Negative for dysuria, flank pain and hematuria.   Musculoskeletal:  Negative for back pain.   Skin:  Positive for color change, rash and wound. Negative for pallor.   Neurological:  Negative for dizziness, seizures, facial asymmetry, speech difficulty and numbness.   Hematological:  Does not bruise/bleed easily.   Psychiatric/Behavioral:  Negative for agitation and suicidal ideas. The patient is not nervous/anxious.    Objective:     Vital Signs (Most Recent):  Temp: 99.1 °F (37.3 °C) (03/22/22 0715)  Pulse: 88  (03/22/22 0715)  Resp: 12 (03/22/22 0715)  BP: 133/65 (03/22/22 0715)  SpO2: 97 % (03/22/22 0722) Vital Signs (24h Range):  Temp:  [97 °F (36.1 °C)-103.1 °F (39.5 °C)] 99.1 °F (37.3 °C)  Pulse:  [] 88  Resp:  [12-20] 12  SpO2:  [95 %-100 %] 97 %  BP: (131-164)/(65-80) 133/65     Weight: (!) 230.7 kg (508 lb 11.4 oz)  Body mass index is 65.31 kg/m².    Physical Exam  Vitals and nursing note reviewed.   Constitutional:       General: He is not in acute distress.     Appearance: He is well-developed. He is obese. He is not ill-appearing, toxic-appearing or diaphoretic.   HENT:      Head: Normocephalic and atraumatic.      Nose: Nose normal.   Eyes:      Extraocular Movements: EOM normal.      Pupils: Pupils are equal, round, and reactive to light.   Cardiovascular:      Rate and Rhythm: Normal rate and regular rhythm.      Heart sounds: No murmur heard.  Pulmonary:      Effort: Pulmonary effort is normal.      Breath sounds: Normal breath sounds.   Abdominal:      General: Bowel sounds are normal.      Palpations: Abdomen is soft.   Musculoskeletal:         General: Tenderness present. Normal range of motion.      Cervical back: Normal range of motion and neck supple.   Skin:     General: Skin is warm and dry.      Capillary Refill: Capillary refill takes less than 2 seconds.      Findings: Erythema present.   Neurological:      Mental Status: He is alert and oriented to person, place, and time.   Psychiatric:         Behavior: Behavior normal.         Thought Content: Thought content normal.         Judgment: Judgment normal.         CRANIAL NERVES     CN III, IV, VI   Pupils are equal, round, and reactive to light.  Extraocular motions are normal.          Significant Labs: All pertinent labs within the past 24 hours have been reviewed.  A1C:   Recent Labs   Lab 03/21/22  1448   HGBA1C 6.4*     ABGs: No results for input(s): PH, PCO2, HCO3, POCSATURATED, BE, TOTALHB, COHB, METHB, O2HB, POCFIO2, PO2 in the last  48 hours.  Bilirubin:   Recent Labs   Lab 03/21/22  1100 03/22/22  0540   BILITOT 3.2* 3.0*     Blood Culture:   Recent Labs   Lab 03/21/22  1100 03/21/22  1127   LABBLOO No Growth to date No Growth to date     CBC:   Recent Labs   Lab 03/21/22  1100 03/22/22  0540   WBC 17.50* 12.42   HGB 13.4* 12.4*   HCT 42.2 40.5    127*     CMP:   Recent Labs   Lab 03/21/22  1100 03/22/22  0540   * 137   K 3.7 3.3*   CL 99 105   CO2 24 21*   * 149*   BUN 10 9   CREATININE 1.0 0.8   CALCIUM 8.9 8.4*   PROT 7.5 6.6   ALBUMIN 3.5 2.8*   BILITOT 3.2* 3.0*   ALKPHOS 70 62   AST 20 22   ALT 31 26   ANIONGAP 12 11   EGFRNONAA >60 >60     Cardiac Markers: No results for input(s): CKMB, MYOGLOBIN, BNP, TROPISTAT in the last 48 hours.  Lactic Acid:   Recent Labs   Lab 03/21/22  1100 03/21/22  1448   LACTATE 1.9 1.2     Magnesium: No results for input(s): MG in the last 48 hours.  POCT Glucose:   Recent Labs   Lab 03/21/22  1659 03/21/22  1903 03/22/22  0603   POCTGLUCOSE 127* 122* 148*     Troponin: No results for input(s): TROPONINI in the last 48 hours.  TSH: No results for input(s): TSH in the last 4320 hours.  Urine Culture: No results for input(s): LABURIN in the last 48 hours.  Urine Studies: No results for input(s): COLORU, APPEARANCEUA, PHUR, SPECGRAV, PROTEINUA, GLUCUA, KETONESU, BILIRUBINUA, OCCULTUA, NITRITE, UROBILINOGEN, LEUKOCYTESUR, RBCUA, WBCUA, BACTERIA, SQUAMEPITHEL, HYALINECASTS in the last 48 hours.    Invalid input(s): WRIGHTSUR    Significant Imaging: I have reviewed and interpreted all pertinent imaging results/findings within the past 24 hours.    Xray - Right tib  fib-   Soft tissue swelling.  Vascular calcifications.  No evidence of a radiopaque foreign body.     There is no evidence of fracture, dislocation or other acute osseous abnormality.  CXR- 3/21/22  The cardiac silhouette is within normal limits considering portable technique.  No focal infiltrate or effusion.  No acute osseous  abnormality.

## 2022-03-22 NOTE — PLAN OF CARE
Problem: Adult Inpatient Plan of Care  Goal: Plan of Care Review  Outcome: Ongoing, Progressing  Goal: Patient-Specific Goal (Individualized)  Outcome: Ongoing, Progressing  Goal: Absence of Hospital-Acquired Illness or Injury  Outcome: Ongoing, Progressing  Goal: Optimal Comfort and Wellbeing  Outcome: Ongoing, Progressing  Goal: Readiness for Transition of Care  Outcome: Ongoing, Progressing     Problem: Diabetes Comorbidity  Goal: Blood Glucose Level Within Targeted Range  Outcome: Ongoing, Progressing     Problem: Pain Acute  Goal: Acceptable Pain Control and Functional Ability  Outcome: Ongoing, Progressing     Problem: Fall Injury Risk  Goal: Absence of Fall and Fall-Related Injury  Outcome: Ongoing, Progressing     Problem: Infection  Goal: Absence of Infection Signs and Symptoms  Outcome: Ongoing, Progressing     Problem: Bariatric Environmental Safety  Goal: Safety Maintained with Care  Outcome: Ongoing, Progressing

## 2022-03-23 LAB
ANION GAP SERPL CALC-SCNC: 10 MMOL/L (ref 8–16)
BACTERIA #/AREA URNS HPF: ABNORMAL /HPF
BASOPHILS # BLD AUTO: 0.02 K/UL (ref 0–0.2)
BASOPHILS NFR BLD: 0.2 % (ref 0–1.9)
BILIRUB UR QL STRIP: NEGATIVE
BUN SERPL-MCNC: 10 MG/DL (ref 6–20)
CALCIUM SERPL-MCNC: 8.6 MG/DL (ref 8.7–10.5)
CHLORIDE SERPL-SCNC: 106 MMOL/L (ref 95–110)
CLARITY UR: CLEAR
CO2 SERPL-SCNC: 23 MMOL/L (ref 23–29)
COLOR UR: YELLOW
CREAT SERPL-MCNC: 0.8 MG/DL (ref 0.5–1.4)
DIFFERENTIAL METHOD: ABNORMAL
EOSINOPHIL # BLD AUTO: 0.2 K/UL (ref 0–0.5)
EOSINOPHIL NFR BLD: 2.4 % (ref 0–8)
ERYTHROCYTE [DISTWIDTH] IN BLOOD BY AUTOMATED COUNT: 13.7 % (ref 11.5–14.5)
EST. GFR  (AFRICAN AMERICAN): >60 ML/MIN/1.73 M^2
EST. GFR  (NON AFRICAN AMERICAN): >60 ML/MIN/1.73 M^2
GLUCOSE SERPL-MCNC: 146 MG/DL (ref 70–110)
GLUCOSE UR QL STRIP: NEGATIVE
HCT VFR BLD AUTO: 39.7 % (ref 40–54)
HGB BLD-MCNC: 12.4 G/DL (ref 14–18)
HGB UR QL STRIP: ABNORMAL
HYALINE CASTS #/AREA URNS LPF: 0 /LPF
IMM GRANULOCYTES # BLD AUTO: 0.03 K/UL (ref 0–0.04)
IMM GRANULOCYTES NFR BLD AUTO: 0.3 % (ref 0–0.5)
KETONES UR QL STRIP: ABNORMAL
LEUKOCYTE ESTERASE UR QL STRIP: NEGATIVE
LYMPHOCYTES # BLD AUTO: 1.9 K/UL (ref 1–4.8)
LYMPHOCYTES NFR BLD: 21 % (ref 18–48)
MCH RBC QN AUTO: 27.7 PG (ref 27–31)
MCHC RBC AUTO-ENTMCNC: 31.2 G/DL (ref 32–36)
MCV RBC AUTO: 89 FL (ref 82–98)
MICROSCOPIC COMMENT: ABNORMAL
MONOCYTES # BLD AUTO: 0.6 K/UL (ref 0.3–1)
MONOCYTES NFR BLD: 6.9 % (ref 4–15)
NEUTROPHILS # BLD AUTO: 6.3 K/UL (ref 1.8–7.7)
NEUTROPHILS NFR BLD: 69.2 % (ref 38–73)
NITRITE UR QL STRIP: NEGATIVE
NRBC BLD-RTO: 0 /100 WBC
PH UR STRIP: 6 [PH] (ref 5–8)
PLATELET # BLD AUTO: 164 K/UL (ref 150–450)
PMV BLD AUTO: 11.2 FL (ref 9.2–12.9)
POCT GLUCOSE: 124 MG/DL (ref 70–110)
POCT GLUCOSE: 130 MG/DL (ref 70–110)
POCT GLUCOSE: 157 MG/DL (ref 70–110)
POCT GLUCOSE: 186 MG/DL (ref 70–110)
POTASSIUM SERPL-SCNC: 3.6 MMOL/L (ref 3.5–5.1)
PROT UR QL STRIP: ABNORMAL
RBC # BLD AUTO: 4.48 M/UL (ref 4.6–6.2)
RBC #/AREA URNS HPF: 2 /HPF (ref 0–4)
SODIUM SERPL-SCNC: 139 MMOL/L (ref 136–145)
SP GR UR STRIP: >=1.03 (ref 1–1.03)
URN SPEC COLLECT METH UR: ABNORMAL
UROBILINOGEN UR STRIP-ACNC: 1 EU/DL
WBC # BLD AUTO: 9.17 K/UL (ref 3.9–12.7)
WBC #/AREA URNS HPF: 5 /HPF (ref 0–5)
YEAST URNS QL MICRO: ABNORMAL

## 2022-03-23 PROCEDURE — 94761 N-INVAS EAR/PLS OXIMETRY MLT: CPT

## 2022-03-23 PROCEDURE — 25000003 PHARM REV CODE 250: Performed by: INTERNAL MEDICINE

## 2022-03-23 PROCEDURE — 11000001 HC ACUTE MED/SURG PRIVATE ROOM

## 2022-03-23 PROCEDURE — 25000003 PHARM REV CODE 250: Performed by: STUDENT IN AN ORGANIZED HEALTH CARE EDUCATION/TRAINING PROGRAM

## 2022-03-23 PROCEDURE — 81000 URINALYSIS NONAUTO W/SCOPE: CPT | Performed by: STUDENT IN AN ORGANIZED HEALTH CARE EDUCATION/TRAINING PROGRAM

## 2022-03-23 PROCEDURE — 25000003 PHARM REV CODE 250: Performed by: NURSE PRACTITIONER

## 2022-03-23 PROCEDURE — 36415 COLL VENOUS BLD VENIPUNCTURE: CPT | Performed by: NURSE PRACTITIONER

## 2022-03-23 PROCEDURE — 63600175 PHARM REV CODE 636 W HCPCS: Performed by: NURSE PRACTITIONER

## 2022-03-23 PROCEDURE — 99232 PR SUBSEQUENT HOSPITAL CARE,LEVL II: ICD-10-PCS | Mod: ,,, | Performed by: FAMILY MEDICINE

## 2022-03-23 PROCEDURE — A4216 STERILE WATER/SALINE, 10 ML: HCPCS | Performed by: STUDENT IN AN ORGANIZED HEALTH CARE EDUCATION/TRAINING PROGRAM

## 2022-03-23 PROCEDURE — 94760 N-INVAS EAR/PLS OXIMETRY 1: CPT

## 2022-03-23 PROCEDURE — 80048 BASIC METABOLIC PNL TOTAL CA: CPT | Performed by: NURSE PRACTITIONER

## 2022-03-23 PROCEDURE — 99232 SBSQ HOSP IP/OBS MODERATE 35: CPT | Mod: ,,, | Performed by: FAMILY MEDICINE

## 2022-03-23 PROCEDURE — 63600175 PHARM REV CODE 636 W HCPCS: Performed by: INTERNAL MEDICINE

## 2022-03-23 PROCEDURE — 85025 COMPLETE CBC W/AUTO DIFF WBC: CPT | Performed by: NURSE PRACTITIONER

## 2022-03-23 RX ORDER — POTASSIUM CHLORIDE 20 MEQ/1
20 TABLET, EXTENDED RELEASE ORAL ONCE
Status: COMPLETED | OUTPATIENT
Start: 2022-03-23 | End: 2022-03-23

## 2022-03-23 RX ORDER — LEVOFLOXACIN 5 MG/ML
500 INJECTION, SOLUTION INTRAVENOUS
Status: DISCONTINUED | OUTPATIENT
Start: 2022-03-23 | End: 2022-03-23

## 2022-03-23 RX ORDER — TRIAMCINOLONE ACETONIDE 40 MG/ML
60 INJECTION, SUSPENSION INTRA-ARTICULAR; INTRAMUSCULAR ONCE
Status: COMPLETED | OUTPATIENT
Start: 2022-03-23 | End: 2022-03-23

## 2022-03-23 RX ORDER — LEVOFLOXACIN 5 MG/ML
750 INJECTION, SOLUTION INTRAVENOUS
Status: DISCONTINUED | OUTPATIENT
Start: 2022-03-23 | End: 2022-03-27 | Stop reason: HOSPADM

## 2022-03-23 RX ORDER — VANCOMYCIN HYDROCHLORIDE 1 G/20ML
INJECTION, POWDER, LYOPHILIZED, FOR SOLUTION INTRAVENOUS
Status: DISCONTINUED
Start: 2022-03-23 | End: 2022-03-24

## 2022-03-23 RX ADMIN — MELATONIN TAB 3 MG 6 MG: 3 TAB at 10:03

## 2022-03-23 RX ADMIN — OXYCODONE HYDROCHLORIDE 5 MG: 5 TABLET ORAL at 10:03

## 2022-03-23 RX ADMIN — ASPIRIN 325 MG ORAL TABLET 325 MG: 325 PILL ORAL at 08:03

## 2022-03-23 RX ADMIN — OXYCODONE HYDROCHLORIDE 5 MG: 5 TABLET ORAL at 11:03

## 2022-03-23 RX ADMIN — VANCOMYCIN HYDROCHLORIDE 2250 MG: 1.25 INJECTION, POWDER, LYOPHILIZED, FOR SOLUTION INTRAVENOUS at 12:03

## 2022-03-23 RX ADMIN — INSULIN ASPART 1 UNITS: 100 INJECTION, SOLUTION INTRAVENOUS; SUBCUTANEOUS at 10:03

## 2022-03-23 RX ADMIN — LISINOPRIL 20 MG: 20 TABLET ORAL at 08:03

## 2022-03-23 RX ADMIN — ATORVASTATIN CALCIUM 40 MG: 40 TABLET, FILM COATED ORAL at 08:03

## 2022-03-23 RX ADMIN — LEVOFLOXACIN 750 MG: 750 INJECTION, SOLUTION INTRAVENOUS at 10:03

## 2022-03-23 RX ADMIN — Medication 10 ML: at 12:03

## 2022-03-23 RX ADMIN — INSULIN ASPART 2 UNITS: 100 INJECTION, SOLUTION INTRAVENOUS; SUBCUTANEOUS at 08:03

## 2022-03-23 RX ADMIN — TRIAMCINOLONE ACETONIDE 60 MG: 40 INJECTION, SUSPENSION INTRA-ARTICULAR; INTRAMUSCULAR at 12:03

## 2022-03-23 RX ADMIN — CLINDAMYCIN PHOSPHATE 900 MG: 900 INJECTION, SOLUTION INTRAVENOUS at 03:03

## 2022-03-23 RX ADMIN — POTASSIUM CHLORIDE 20 MEQ: 1500 TABLET, EXTENDED RELEASE ORAL at 08:03

## 2022-03-23 NOTE — SUBJECTIVE & OBJECTIVE
Past Medical History:   Diagnosis Date    Diabetes mellitus     Hypertension     Psoriasis     Sleep apnea, unspecified        History reviewed. No pertinent surgical history.    Review of patient's allergies indicates:   Allergen Reactions    Bactrim [sulfamethoxazole-trimethoprim]        No current facility-administered medications on file prior to encounter.     Current Outpatient Medications on File Prior to Encounter   Medication Sig    aspirin 325 MG tablet Take 325 mg by mouth once daily.    lisinopriL (PRINIVIL,ZESTRIL) 20 MG tablet Take 20 mg by mouth once daily.    metFORMIN (GLUCOPHAGE) 1000 MG tablet Take 1,000 mg by mouth every evening.    rosuvastatin (CRESTOR) 10 MG tablet Take 10 mg by mouth once daily.     Family History    None       Tobacco Use    Smoking status: Current Every Day Smoker     Types: Cigarettes    Smokeless tobacco: Never Used   Substance and Sexual Activity    Alcohol use: Yes    Drug use: Not on file    Sexual activity: Not on file     Review of Systems   Constitutional:  Positive for activity change, fatigue and fever. Negative for chills.   HENT:  Negative for congestion, postnasal drip and sore throat.    Eyes:  Negative for photophobia.   Respiratory:  Negative for chest tightness and shortness of breath.    Cardiovascular:  Negative for chest pain.   Gastrointestinal:  Negative for abdominal distention, abdominal pain, blood in stool and vomiting.   Genitourinary:  Negative for dysuria, flank pain and hematuria.   Musculoskeletal:  Negative for back pain.   Skin:  Positive for color change, rash and wound. Negative for pallor.   Neurological:  Negative for dizziness, seizures, facial asymmetry, speech difficulty and numbness.   Hematological:  Does not bruise/bleed easily.   Psychiatric/Behavioral:  Negative for agitation and suicidal ideas. The patient is not nervous/anxious.    Objective:     Vital Signs (Most Recent):  Temp: 99 °F (37.2 °C) (03/23/22 0728)  Pulse: 85  (03/23/22 0728)  Resp: 17 (03/23/22 0728)  BP: 127/72 (03/23/22 0728)  SpO2: 99 % (03/23/22 0728) Vital Signs (24h Range):  Temp:  [97.3 °F (36.3 °C)-99.5 °F (37.5 °C)] 99 °F (37.2 °C)  Pulse:  [75-99] 85  Resp:  [17-22] 17  SpO2:  [96 %-99 %] 99 %  BP: (125-134)/(61-72) 127/72     Weight: (!) 230.7 kg (508 lb 11.4 oz)  Body mass index is 65.31 kg/m².    Physical Exam  Vitals and nursing note reviewed.   Constitutional:       General: He is not in acute distress.     Appearance: He is well-developed. He is obese. He is not ill-appearing, toxic-appearing or diaphoretic.   HENT:      Head: Normocephalic and atraumatic.      Nose: Nose normal.   Eyes:      Extraocular Movements: EOM normal.      Pupils: Pupils are equal, round, and reactive to light.   Cardiovascular:      Rate and Rhythm: Normal rate and regular rhythm.      Heart sounds: No murmur heard.  Pulmonary:      Effort: Pulmonary effort is normal.      Breath sounds: Normal breath sounds.   Abdominal:      General: Bowel sounds are normal.      Palpations: Abdomen is soft.   Musculoskeletal:         General: Tenderness present. Normal range of motion.      Cervical back: Normal range of motion and neck supple.   Skin:     General: Skin is warm and dry.      Capillary Refill: Capillary refill takes less than 2 seconds.      Findings: Erythema present.   Neurological:      Mental Status: He is alert and oriented to person, place, and time.   Psychiatric:         Behavior: Behavior normal.         Thought Content: Thought content normal.         Judgment: Judgment normal.         CRANIAL NERVES     CN III, IV, VI   Pupils are equal, round, and reactive to light.  Extraocular motions are normal.    Below 3/22/2022      2nd picture 2/23/22     Significant Labs: All pertinent labs within the past 24 hours have been reviewed.  A1C:   Recent Labs   Lab 03/21/22  1448   HGBA1C 6.4*       ABGs: No results for input(s): PH, PCO2, HCO3, POCSATURATED, BE, TOTALHB, COHB,  METHB, O2HB, POCFIO2, PO2 in the last 48 hours.  Bilirubin:   Recent Labs   Lab 03/21/22  1100 03/22/22  0540   BILITOT 3.2* 3.0*       Blood Culture:   Recent Labs   Lab 03/21/22  1100 03/21/22  1127   LABBLOO No Growth to date  No Growth to date No Growth to date  No Growth to date       CBC:   Recent Labs   Lab 03/21/22  1100 03/22/22  0540 03/23/22  0542   WBC 17.50* 12.42 9.17   HGB 13.4* 12.4* 12.4*   HCT 42.2 40.5 39.7*    127* 164       CMP:   Recent Labs   Lab 03/21/22  1100 03/22/22  0540 03/23/22  0542   * 137 139   K 3.7 3.3* 3.6   CL 99 105 106   CO2 24 21* 23   * 149* 146*   BUN 10 9 10   CREATININE 1.0 0.8 0.8   CALCIUM 8.9 8.4* 8.6*   PROT 7.5 6.6  --    ALBUMIN 3.5 2.8*  --    BILITOT 3.2* 3.0*  --    ALKPHOS 70 62  --    AST 20 22  --    ALT 31 26  --    ANIONGAP 12 11 10   EGFRNONAA >60 >60 >60       Cardiac Markers: No results for input(s): CKMB, MYOGLOBIN, BNP, TROPISTAT in the last 48 hours.  Lactic Acid:   Recent Labs   Lab 03/21/22  1100 03/21/22  1448   LACTATE 1.9 1.2       Magnesium: No results for input(s): MG in the last 48 hours.  POCT Glucose:   Recent Labs   Lab 03/22/22  1630 03/22/22  1855 03/23/22  0609   POCTGLUCOSE 142* 157* 157*       Troponin: No results for input(s): TROPONINI in the last 48 hours.  TSH: No results for input(s): TSH in the last 4320 hours.  Urine Culture: No results for input(s): LABURIN in the last 48 hours.  Urine Studies: No results for input(s): COLORU, APPEARANCEUA, PHUR, SPECGRAV, PROTEINUA, GLUCUA, KETONESU, BILIRUBINUA, OCCULTUA, NITRITE, UROBILINOGEN, LEUKOCYTESUR, RBCUA, WBCUA, BACTERIA, SQUAMEPITHEL, HYALINECASTS in the last 48 hours.    Invalid input(s): WRIGHTSUR    Bilateral LE US-   No ultrasound evidence of lower extremity deep venous thrombosis.    Significant Imaging: I have reviewed and interpreted all pertinent imaging results/findings within the past 24 hours.    Xray - Right tib  fib-   Soft tissue swelling.   Vascular calcifications.  No evidence of a radiopaque foreign body.     There is no evidence of fracture, dislocation or other acute osseous abnormality.  CXR- 3/21/22  The cardiac silhouette is within normal limits considering portable technique.  No focal infiltrate or effusion.  No acute osseous abnormality.

## 2022-03-23 NOTE — PLAN OF CARE
Problem: Adult Inpatient Plan of Care  Goal: Plan of Care Review  Outcome: Ongoing, Progressing     Problem: Adult Inpatient Plan of Care  Goal: Patient-Specific Goal (Individualized)  Outcome: Ongoing, Progressing     Problem: Adult Inpatient Plan of Care  Goal: Absence of Hospital-Acquired Illness or Injury  Outcome: Ongoing, Progressing     Problem: Adult Inpatient Plan of Care  Goal: Optimal Comfort and Wellbeing  Outcome: Ongoing, Progressing     Problem: Adult Inpatient Plan of Care  Goal: Readiness for Transition of Care  Outcome: Ongoing, Progressing    Pt admitted with cellulitis to E.   Pt on IV vanc and levaquin.  Vanc trough 3/24/22 @ 1130.   WBC trending down, 17-->9   Bilat lower extremity US negative for DVT.   Significant redness noted from R foot to knee.   Pain managed with prn oxycodone.  Blood glucose checks ACHS.  Sugars today 157,130,124.   Prn sliding scale insulin for blood glucose >150.   Pt updated on POC, all questions answered.

## 2022-03-23 NOTE — PROGRESS NOTES
Nursing Notes    Walking rounding.  Report received from Mihir BOWMAN.  Patient awake and alert without complaints.  Call bell in reach.    Huddle Comments

## 2022-03-23 NOTE — PROGRESS NOTES
[facility] ([facility])  [facility] Medicine  Progress Note    Patient Name: Luis Carlos Rueda  MRN: [id number]  Patient Class: IP- Inpatient   Admission Date: 3/21/2022  Length of Stay: 2 days  Attending Physician: Amy Harden MD  Primary Care Provider: Vel Yeung NP        Subjective:     Principal Problem:Cellulitis of right lower leg        HPI:  Luis Carlos Rueda is a 37 y.o. male with known Type II DM, HTN, and morbid obesity presented to the ED with his wife for right lower leg redness, pain, fever. He Noticed a small wound on the mid-anterior shin on Friday. Initially started as a fist sized redness but now spread to most of his lower leg. Works from home.. No exposure to fresh or salt water. Noted fever on [place] and presented yesterday am.   Procal 1.44 abnormal     Day 2 IV vanc and Clinda   + Fever tmax 102.7   WBC 17.50> 12.42       Overview/Hospital Course:  3/23/22 Day 3 Vanc and Clinda for Right LE cellulitis, tmax 99.5, WBC 17.50>12.42.>9.17  Temp curve and WBC better but still noting significant erythema, not much improved ; will stop clinda and add levofloxacin for gram negative coverage , Vanc trough 8.5 yesterday   -199, well controlled, BP stable with lisinopril    Bilateral LE US- negative for DVT   Using home CPAP at night   C/o pain, has pRN oxycodoen, requested NSAID but will avoid to protect kidneys while getting Vanc; give kenalog 60mg IM x 1           Past Medical History:   Diagnosis Date    Diabetes mellitus     Hypertension     Psoriasis     Sleep apnea, unspecified        History reviewed. No pertinent surgical history.    Review of patient's allergies indicates:   Allergen Reactions    Bactrim [sulfamethoxazole-trimethoprim]        No current facility-administered medications on file prior to encounter.     Current Outpatient Medications on File Prior to Encounter   Medication Sig    aspirin 325 MG tablet Take 325 mg by mouth once daily.    lisinopriL (PRINIVIL,ZESTRIL)  20 MG tablet Take 20 mg by mouth once daily.    metFORMIN (GLUCOPHAGE) 1000 MG tablet Take 1,000 mg by mouth every evening.    rosuvastatin (CRESTOR) 10 MG tablet Take 10 mg by mouth once daily.     Family History    None       Tobacco Use    Smoking status: Current Every Day Smoker     Types: Cigarettes    Smokeless tobacco: Never Used   Substance and Sexual Activity    Alcohol use: Yes    Drug use: Not on file    Sexual activity: Not on file     Review of Systems   Constitutional:  Positive for activity change, fatigue and fever. Negative for chills.   HENT:  Negative for congestion, postnasal drip and sore throat.    Eyes:  Negative for photophobia.   Respiratory:  Negative for chest tightness and shortness of breath.    Cardiovascular:  Negative for chest pain.   Gastrointestinal:  Negative for abdominal distention, abdominal pain, blood in stool and vomiting.   Genitourinary:  Negative for dysuria, flank pain and hematuria.   Musculoskeletal:  Negative for back pain.   Skin:  Positive for color change, rash and wound. Negative for pallor.   Neurological:  Negative for dizziness, seizures, facial asymmetry, speech difficulty and numbness.   Hematological:  Does not bruise/bleed easily.   Psychiatric/Behavioral:  Negative for agitation and suicidal ideas. The patient is not nervous/anxious.    Objective:     Vital Signs (Most Recent):  Temp: 99 °F (37.2 °C) (03/23/22 0728)  Pulse: 85 (03/23/22 0728)  Resp: 17 (03/23/22 0728)  BP: 127/72 (03/23/22 0728)  SpO2: 99 % (03/23/22 0728) Vital Signs (24h Range):  Temp:  [97.3 °F (36.3 °C)-99.5 °F (37.5 °C)] 99 °F (37.2 °C)  Pulse:  [75-99] 85  Resp:  [17-22] 17  SpO2:  [96 %-99 %] 99 %  BP: (125-134)/(61-72) 127/72     Weight: (!) 230.7 kg (508 lb 11.4 oz)  Body mass index is 65.31 kg/m².    Physical Exam  Vitals and nursing note reviewed.   Constitutional:       General: He is not in acute distress.     Appearance: He is well-developed. He is obese. He is not  ill-appearing, toxic-appearing or diaphoretic.   HENT:      Head: Normocephalic and atraumatic.      Nose: Nose normal.   Eyes:      Extraocular Movements: EOM normal.      Pupils: Pupils are equal, round, and reactive to light.   Cardiovascular:      Rate and Rhythm: Normal rate and regular rhythm.      Heart sounds: No murmur heard.  Pulmonary:      Effort: Pulmonary effort is normal.      Breath sounds: Normal breath sounds.   Abdominal:      General: Bowel sounds are normal.      Palpations: Abdomen is soft.   Musculoskeletal:         General: Tenderness present. Normal range of motion.      Cervical back: Normal range of motion and neck supple.   Skin:     General: Skin is warm and dry.      Capillary Refill: Capillary refill takes less than 2 seconds.      Findings: Erythema present.   Neurological:      Mental Status: He is alert and oriented to person, place, and time.   Psychiatric:         Behavior: Behavior normal.         Thought Content: Thought content normal.         Judgment: Judgment normal.         CRANIAL NERVES     CN III, IV, VI   Pupils are equal, round, and reactive to light.  Extraocular motions are normal.    Below 3/22/2022      2nd picture 2/23/22     Significant Labs: All pertinent labs within the past 24 hours have been reviewed.  A1C:   Recent Labs   Lab 03/21/22  1448   HGBA1C 6.4*       ABGs: No results for input(s): PH, PCO2, HCO3, POCSATURATED, BE, TOTALHB, COHB, METHB, O2HB, POCFIO2, PO2 in the last 48 hours.  Bilirubin:   Recent Labs   Lab 03/21/22  1100 03/22/22  0540   BILITOT 3.2* 3.0*       Blood Culture:   Recent Labs   Lab 03/21/22  1100 03/21/22  1127   LABBLOO No Growth to date  No Growth to date No Growth to date  No Growth to date       CBC:   Recent Labs   Lab 03/21/22  1100 03/22/22  0540 03/23/22  0542   WBC 17.50* 12.42 9.17   HGB 13.4* 12.4* 12.4*   HCT 42.2 40.5 39.7*    127* 164       CMP:   Recent Labs   Lab 03/21/22  1100 03/22/22  0540 03/23/22  0542    * 137 139   K 3.7 3.3* 3.6   CL 99 105 106   CO2 24 21* 23   * 149* 146*   BUN 10 9 10   CREATININE 1.0 0.8 0.8   CALCIUM 8.9 8.4* 8.6*   PROT 7.5 6.6  --    ALBUMIN 3.5 2.8*  --    BILITOT 3.2* 3.0*  --    ALKPHOS 70 62  --    AST 20 22  --    ALT 31 26  --    ANIONGAP 12 11 10   EGFRNONAA >60 >60 >60       Cardiac Markers: No results for input(s): CKMB, MYOGLOBIN, BNP, TROPISTAT in the last 48 hours.  Lactic Acid:   Recent Labs   Lab 03/21/22  1100 03/21/22  1448   LACTATE 1.9 1.2       Magnesium: No results for input(s): MG in the last 48 hours.  POCT Glucose:   Recent Labs   Lab 03/22/22  1630 03/22/22  1855 03/23/22  0609   POCTGLUCOSE 142* 157* 157*       Troponin: No results for input(s): TROPONINI in the last 48 hours.  TSH: No results for input(s): TSH in the last 4320 hours.  Urine Culture: No results for input(s): LABURIN in the last 48 hours.  Urine Studies: No results for input(s): COLORU, APPEARANCEUA, PHUR, SPECGRAV, PROTEINUA, GLUCUA, KETONESU, BILIRUBINUA, OCCULTUA, NITRITE, UROBILINOGEN, LEUKOCYTESUR, RBCUA, WBCUA, BACTERIA, SQUAMEPITHEL, HYALINECASTS in the last 48 hours.    Invalid input(s): WRIGHTSUR    Bilateral LE US-   No ultrasound evidence of lower extremity deep venous thrombosis.    Significant Imaging: I have reviewed and interpreted all pertinent imaging results/findings within the past 24 hours.    Xray - Right tib  fib-   Soft tissue swelling.  Vascular calcifications.  No evidence of a radiopaque foreign body.     There is no evidence of fracture, dislocation or other acute osseous abnormality.  CXR- 3/21/22  The cardiac silhouette is within normal limits considering portable technique.  No focal infiltrate or effusion.  No acute osseous abnormality.         Assessment/Plan:      * Cellulitis of right lower leg  Day 2 IV Vanc &  clinda ; had 3 rd dose of Clinda this am; will continue   + Fever , blood Cx NGTd  WBC 17.50> 12.42   Bilateral LE US   Repeat labs in am  .  3/23 Day 3 vanc and clinda   Temp curve and WBC better but still noting significant erythema, not much improved ; will stop clinda and add levofloxacin for gram negative coverage      Morbid obesity    # The patient is asked to make an attempt to improve diet and exercise patterns to aid in medical management of this problem.     # Eat  5 small meals a day.     # Cut out high carbohydrate  foods : bread, rice, pasta, potatoes.     # Exercise/walk 5x/week for at least 30-45  minutes.              Primary hypertension  Continue lisinopril 20mg daily   BP stable .    Type 2 diabetes mellitus without complication, without long-term current use of insulin  Glucophage 1,000mg q hs at home  Correction scale here  Controlled   Diabetic diet .      Hypokalemia  Supplement and monitor .        Obstructive sleep apnea (adult) (pediatric)  Has home machine and using at night- continue .        VTE Risk Mitigation (From admission, onward)         Ordered     IP VTE LOW RISK PATIENT  Once         03/21/22 1446     Place sequential compression device  Until discontinued         03/21/22 1446     Place TATY hose  Until discontinued         03/21/22 1446                Discharge Planning   [place]:      Code Status: Full Code   Is the patient medically ready for discharge?:     Reason for patient still in hospital (select all that apply): Treatment  Discharge Plan A: Home                  Elder Jacobson MD  Department of Hospital Medicine   [place] - Med Surg (3rd Fl)

## 2022-03-23 NOTE — PLAN OF CARE
Plan of Care:  Monitor vital signs, telemetry, SATs, labs.  IVPB antibiotics per pump.  Right leg cellulitis marked.  Oral pain management.  Afebrile.  Glycemic controls in place.  No falls this shift.

## 2022-03-23 NOTE — PROGRESS NOTES
Pharmacokinetic Assessment Follow Up: IV Vancomycin    Vancomycin serum concentration assessment(s):    The trough level was drawn correctly and can be used to guide therapy at this time. The measurement is below the desired definitive target range of 10 to 15 mcg/mL.    Vancomycin Regimen Plan:    Change regimen to Vancomycin 2250 mg IV every 12 hours with next serum trough concentration measured at 3/24 prior to 4 dose on 1130    Drug levels (last 3 results):  Recent Labs   Lab Result Units 03/22/22  2332   Vancomycin-Trough ug/mL 8.5*       Pharmacy will continue to follow and monitor vancomycin.    Please contact pharmacy at extension [phone] for questions regarding this assessment.    Thank you for the consult,   Qing Chapman       Patient brief summary:  Luis Carlos Rueda is a 37 y.o. male initiated on antimicrobial therapy with IV Vancomycin for treatment of skin & soft tissue infection    The patient's current regimen is vanco 2g q12    Drug Allergies:   Review of patient's allergies indicates:   Allergen Reactions    Bactrim [sulfamethoxazole-trimethoprim]        Actual Body Weight:   230kg    Renal Function:   Estimated Creatinine Clearance: 250.8 mL/min (based on SCr of 0.8 mg/dL).,     Dialysis Method (if applicable):  N/A    CBC (last 72 hours):  Recent Labs   Lab Result Units 03/21/22  1100 03/21/22  1448 03/22/22  0540   WBC K/uL 17.50*  --  12.42   Hemoglobin g/dL 13.4*  --  12.4*   Hemoglobin A1C %  --  6.4*  --    Hematocrit % 42.2  --  40.5   Platelets K/uL 203  --  127*   Gran % % 88.5*  --  80.3*   Lymph % % 6.4*  --  12.6*   Mono % % 4.5  --  6.0   Eosinophil % % 0.0  --  0.6   Basophil % % 0.1  --  0.2   Differential Method  Automated  --  Automated       Metabolic Panel (last 72 hours):  Recent Labs   Lab Result Units 03/21/22  1100 03/22/22  0540   Sodium mmol/L 135* 137   Potassium mmol/L 3.7 3.3*   Chloride mmol/L 99 105   CO2 mmol/L 24 21*   Glucose mg/dL 199* 149*   BUN mg/dL 10 9    Creatinine mg/dL 1.0 0.8   Albumin g/dL 3.5 2.8*   Total Bilirubin mg/dL 3.2* 3.0*   Alkaline Phosphatase U/L 70 62   AST U/L 20 22   ALT U/L 31 26       Vancomycin Administrations:  vancomycin given in the last 96 hours                     vancomycin 2 g in 0.9% sodium chloride 500 mL IVPB (mg) 2,000 mg New Bag 03/22/22 1300     2,000 mg New Bag  0002    vancomycin 2 g in 0.9% sodium chloride 500 mL IVPB (mg) 2,000 mg New Bag 03/21/22 1223                    Microbiologic Results:  Microbiology Results (last 7 days)       Procedure Component Value Units Date/Time    Blood culture x two cultures. Draw prior to antibiotics. [[id number]] Collected: 03/21/22 1100    Order Status: Completed Specimen: Blood from Antecubital, Right Updated: 03/22/22 1612     Blood Culture, Routine No Growth to date      No Growth to date    Narrative:      Aerobic and anaerobic    Blood culture x two cultures. Draw prior to antibiotics. [[id number]] Collected: 03/21/22 1127    Order Status: Completed Specimen: Blood Updated: 03/22/22 1612     Blood Culture, Routine No Growth to date      No Growth to date    Narrative:      Aerobic and anaerobic

## 2022-03-23 NOTE — HOSPITAL COURSE
3/23/22 Day 3 Vanc and Clinda for Right LE cellulitis, tmax 99.5, WBC 17.50>12.42.>9.17  Temp curve and WBC better but still noting significant erythema, not much improved ; will stop clinda and add levofloxacin for gram negative coverage , Vanc trough 8.5 yesterday   -199, well controlled, BP stable with lisinopril    Bilateral LE US- negative for DVT   Using home CPAP at night   C/o pain, has pRN oxycodone, requested NSAID but will avoid to protect kidneys while getting Vanc; give kenalog 60mg IM x 1; monitor BS    3/24/22 Day 4 Vanc, day 2 levofloxacin, Renal fx stable, afebrile   Add Lovenox for DVT prophylaxis   Pt notes leg feels better than yesterday with ambulating   Blood cultures negative x 3d   Lost IV acess for 2nd time; will need at least another 72 hrs abx, will order PICC placement  Monitor renal fx with daily labs     3/25 Day 5 Vanc, day 3 levofloxacin, Renal fx stable, afebrile   Right leg erythema now receding, looking better   Getting Lovenox for DVT prophylaxis , using home CPAP at night   Blood cultures negative x 4d    PICC placed yesterday with placement verified;   + NSVT over night , K+ 3.6, MG+2.1 ; VSS ; cards consulted ; Echo ordered   Monitor renal fx with daily labs     3/26 Day 6 Vanc, day 4 levofloxacin, Renal fx stable, afebrile   Right leg erythema now receding, looking better.  Still with edema  Getting Lovenox for DVT prophylaxis , using home CPAP at night   Blood cultures negative x 4 d    PICC placed yesterday with placement verified;   + NSVT seems to have resolved, ; cards consulted ; Echo ordered     3/27 Day 7 Vanc, day 5 levofloxacin, Renal fx stable, afebrile   Right leg erythema now receding, looking better.  Still with edema  Getting Lovenox for DVT prophylaxis , using home CPAP at night   Blood cultures negative    PICC placed but quickly clotted and never could be used  + NSVT seems to have resolved, ; cards consulted ; Echo with EF of 55%.  No treatment  necessary  Unable to tolerate compression stockings

## 2022-03-23 NOTE — ASSESSMENT & PLAN NOTE
Day 2 IV Vanc &  clinda ; had 3 rd dose of Clinda this am; will continue   + Fever , blood Cx NGTd  WBC 17.50> 12.42   Bilateral LE US   Repeat labs in am .  3/23 Day 3 vanc and clinda   Temp curve and WBC better but still noting significant erythema, not much improved ; will stop clinda and add levofloxacin for gram negative coverage

## 2022-03-23 NOTE — PROGRESS NOTES
Pharmacist Renal Dose Adjustment Note    Luis Carlos Rueda is a 37 y.o. male being treated with the medication Levofloxacin    Patient Data:    Vital Signs (Most Recent):  Temp: 99 °F (37.2 °C) (03/23/22 0728)  Pulse: 91 (03/23/22 0958)  Resp: 17 (03/23/22 0728)  BP: 127/72 (03/23/22 0728)  SpO2: 99 % (03/23/22 0728) Vital Signs (72h Range):  Temp:  [97 °F (36.1 °C)-103.1 °F (39.5 °C)]   Pulse:  []   Resp:  [12-22]   BP: (125-164)/(61-80)   SpO2:  [95 %-100 %]      Recent Labs   Lab 03/21/22  1100 03/22/22  0540 03/23/22  0542   CREATININE 1.0 0.8 0.8     Serum creatinine: 0.8 mg/dL 03/23/22 0542  Estimated creatinine clearance: 250.8 mL/min    Medication:Levofloxacin dose: 500mg frequency Q24 will be changed to medication:Levofloxacin dose:750mg frequency:Q24    Pharmacist's Name: Heather Garcia  Pharmacist's Extension: [phone]

## 2022-03-24 ENCOUNTER — ANESTHESIA EVENT (OUTPATIENT)
Dept: MEDSURG UNIT | Facility: HOSPITAL | Age: 37
DRG: 603 | End: 2022-03-24
Payer: COMMERCIAL

## 2022-03-24 ENCOUNTER — ANESTHESIA (OUTPATIENT)
Dept: MEDSURG UNIT | Facility: HOSPITAL | Age: 37
DRG: 603 | End: 2022-03-24
Payer: COMMERCIAL

## 2022-03-24 ENCOUNTER — ANESTHESIA EVENT (OUTPATIENT)
Dept: MEDSURG UNIT | Facility: HOSPITAL | Age: 37
End: 2022-03-24

## 2022-03-24 ENCOUNTER — ANESTHESIA (OUTPATIENT)
Dept: MEDSURG UNIT | Facility: HOSPITAL | Age: 37
End: 2022-03-24

## 2022-03-24 LAB
ANION GAP SERPL CALC-SCNC: 11 MMOL/L (ref 8–16)
ANION GAP SERPL CALC-SCNC: 8 MMOL/L (ref 8–16)
BUN SERPL-MCNC: 10 MG/DL (ref 6–20)
BUN SERPL-MCNC: 10 MG/DL (ref 6–20)
CALCIUM SERPL-MCNC: 8.3 MG/DL (ref 8.7–10.5)
CALCIUM SERPL-MCNC: 8.8 MG/DL (ref 8.7–10.5)
CHLORIDE SERPL-SCNC: 104 MMOL/L (ref 95–110)
CHLORIDE SERPL-SCNC: 105 MMOL/L (ref 95–110)
CO2 SERPL-SCNC: 23 MMOL/L (ref 23–29)
CO2 SERPL-SCNC: 27 MMOL/L (ref 23–29)
CREAT SERPL-MCNC: 0.8 MG/DL (ref 0.5–1.4)
CREAT SERPL-MCNC: 0.8 MG/DL (ref 0.5–1.4)
EST. GFR  (AFRICAN AMERICAN): >60 ML/MIN/1.73 M^2
EST. GFR  (AFRICAN AMERICAN): >60 ML/MIN/1.73 M^2
EST. GFR  (NON AFRICAN AMERICAN): >60 ML/MIN/1.73 M^2
EST. GFR  (NON AFRICAN AMERICAN): >60 ML/MIN/1.73 M^2
GLUCOSE SERPL-MCNC: 138 MG/DL (ref 70–110)
GLUCOSE SERPL-MCNC: 143 MG/DL (ref 70–110)
MAGNESIUM SERPL-MCNC: 2.1 MG/DL (ref 1.6–2.6)
MAGNESIUM SERPL-MCNC: 2.1 MG/DL (ref 1.6–2.6)
POCT GLUCOSE: 112 MG/DL (ref 70–110)
POCT GLUCOSE: 125 MG/DL (ref 70–110)
POCT GLUCOSE: 125 MG/DL (ref 70–110)
POCT GLUCOSE: 137 MG/DL (ref 70–110)
POTASSIUM SERPL-SCNC: 3.6 MMOL/L (ref 3.5–5.1)
POTASSIUM SERPL-SCNC: 3.7 MMOL/L (ref 3.5–5.1)
SODIUM SERPL-SCNC: 139 MMOL/L (ref 136–145)
SODIUM SERPL-SCNC: 139 MMOL/L (ref 136–145)
VANCOMYCIN TROUGH SERPL-MCNC: 11.2 UG/ML (ref 10–22)

## 2022-03-24 PROCEDURE — 36415 COLL VENOUS BLD VENIPUNCTURE: CPT | Performed by: NURSE PRACTITIONER

## 2022-03-24 PROCEDURE — 63600175 PHARM REV CODE 636 W HCPCS: Performed by: INTERNAL MEDICINE

## 2022-03-24 PROCEDURE — 80202 ASSAY OF VANCOMYCIN: CPT | Performed by: INTERNAL MEDICINE

## 2022-03-24 PROCEDURE — 80048 BASIC METABOLIC PNL TOTAL CA: CPT | Mod: 91 | Performed by: INTERNAL MEDICINE

## 2022-03-24 PROCEDURE — 99232 PR SUBSEQUENT HOSPITAL CARE,LEVL II: ICD-10-PCS | Mod: ,,, | Performed by: FAMILY MEDICINE

## 2022-03-24 PROCEDURE — 11000001 HC ACUTE MED/SURG PRIVATE ROOM

## 2022-03-24 PROCEDURE — 80048 BASIC METABOLIC PNL TOTAL CA: CPT | Performed by: NURSE PRACTITIONER

## 2022-03-24 PROCEDURE — 36415 COLL VENOUS BLD VENIPUNCTURE: CPT | Performed by: INTERNAL MEDICINE

## 2022-03-24 PROCEDURE — 25000003 PHARM REV CODE 250: Performed by: STUDENT IN AN ORGANIZED HEALTH CARE EDUCATION/TRAINING PROGRAM

## 2022-03-24 PROCEDURE — 83735 ASSAY OF MAGNESIUM: CPT | Performed by: NURSE PRACTITIONER

## 2022-03-24 PROCEDURE — 94761 N-INVAS EAR/PLS OXIMETRY MLT: CPT

## 2022-03-24 PROCEDURE — 83735 ASSAY OF MAGNESIUM: CPT | Mod: 91 | Performed by: INTERNAL MEDICINE

## 2022-03-24 PROCEDURE — 99232 SBSQ HOSP IP/OBS MODERATE 35: CPT | Mod: ,,, | Performed by: FAMILY MEDICINE

## 2022-03-24 PROCEDURE — 25000003 PHARM REV CODE 250: Performed by: INTERNAL MEDICINE

## 2022-03-24 PROCEDURE — 63600175 PHARM REV CODE 636 W HCPCS: Performed by: FAMILY MEDICINE

## 2022-03-24 PROCEDURE — 63600175 PHARM REV CODE 636 W HCPCS: Performed by: NURSE PRACTITIONER

## 2022-03-24 RX ORDER — MUPIROCIN 20 MG/G
OINTMENT TOPICAL 2 TIMES DAILY
Status: DISCONTINUED | OUTPATIENT
Start: 2022-03-24 | End: 2022-03-27 | Stop reason: HOSPADM

## 2022-03-24 RX ORDER — ENOXAPARIN SODIUM 100 MG/ML
40 INJECTION SUBCUTANEOUS EVERY 24 HOURS
Status: DISCONTINUED | OUTPATIENT
Start: 2022-03-24 | End: 2022-03-24

## 2022-03-24 RX ORDER — ENOXAPARIN SODIUM 100 MG/ML
40 INJECTION SUBCUTANEOUS EVERY 12 HOURS
Status: DISCONTINUED | OUTPATIENT
Start: 2022-03-24 | End: 2022-03-27 | Stop reason: HOSPADM

## 2022-03-24 RX ORDER — MAGNESIUM SULFATE 1 G/100ML
1 INJECTION INTRAVENOUS ONCE
Status: COMPLETED | OUTPATIENT
Start: 2022-03-24 | End: 2022-03-24

## 2022-03-24 RX ADMIN — LISINOPRIL 20 MG: 20 TABLET ORAL at 08:03

## 2022-03-24 RX ADMIN — ENOXAPARIN SODIUM 40 MG: 40 INJECTION SUBCUTANEOUS at 10:03

## 2022-03-24 RX ADMIN — MAGNESIUM SULFATE 1 G: 1 INJECTION INTRAVENOUS at 08:03

## 2022-03-24 RX ADMIN — ASPIRIN 325 MG ORAL TABLET 325 MG: 325 PILL ORAL at 08:03

## 2022-03-24 RX ADMIN — OXYCODONE HYDROCHLORIDE 5 MG: 5 TABLET ORAL at 09:03

## 2022-03-24 RX ADMIN — VANCOMYCIN HYDROCHLORIDE 2250 MG: 1.25 INJECTION, POWDER, LYOPHILIZED, FOR SOLUTION INTRAVENOUS at 12:03

## 2022-03-24 RX ADMIN — LEVOFLOXACIN 750 MG: 750 INJECTION, SOLUTION INTRAVENOUS at 12:03

## 2022-03-24 RX ADMIN — VANCOMYCIN HYDROCHLORIDE 2250 MG: 1.25 INJECTION, POWDER, LYOPHILIZED, FOR SOLUTION INTRAVENOUS at 02:03

## 2022-03-24 RX ADMIN — MELATONIN TAB 3 MG 6 MG: 3 TAB at 09:03

## 2022-03-24 RX ADMIN — MUPIROCIN: 20 OINTMENT TOPICAL at 08:03

## 2022-03-24 RX ADMIN — ATORVASTATIN CALCIUM 40 MG: 40 TABLET, FILM COATED ORAL at 08:03

## 2022-03-24 RX ADMIN — ENOXAPARIN SODIUM 40 MG: 40 INJECTION SUBCUTANEOUS at 08:03

## 2022-03-24 NOTE — ASSESSMENT & PLAN NOTE
Glucophage 1,000mg q hs at home  Correction scale here  Controlled   Diabetic diet .  3/24 BS stable 124-157 with correction scale

## 2022-03-24 NOTE — ASSESSMENT & PLAN NOTE
Day 2 IV Vanc &  clinda ; had 3 rd dose of Clinda this am; will continue   + Fever , blood Cx NGTd  WBC 17.50> 12.42   Bilateral LE US   Repeat labs in am .  3/23 Day 3 vanc and clinda   Temp curve and WBC better but still noting significant erythema, not much improved ; will stop clinda and add levofloxacin for gram negative coverage  3/24 Day 4 Vanc, day 2 levofloxacin

## 2022-03-24 NOTE — SUBJECTIVE & OBJECTIVE
Past Medical History:   Diagnosis Date    Diabetes mellitus     Hypertension     Psoriasis     Sleep apnea, unspecified        History reviewed. No pertinent surgical history.    Review of patient's allergies indicates:   Allergen Reactions    Bactrim [sulfamethoxazole-trimethoprim]        No current facility-administered medications on file prior to encounter.     Current Outpatient Medications on File Prior to Encounter   Medication Sig    aspirin 325 MG tablet Take 325 mg by mouth once daily.    lisinopriL (PRINIVIL,ZESTRIL) 20 MG tablet Take 20 mg by mouth once daily.    metFORMIN (GLUCOPHAGE) 1000 MG tablet Take 1,000 mg by mouth every evening.    rosuvastatin (CRESTOR) 10 MG tablet Take 10 mg by mouth once daily.     Family History    None       Tobacco Use    Smoking status: Current Every Day Smoker     Types: Cigarettes    Smokeless tobacco: Never Used   Substance and Sexual Activity    Alcohol use: Yes    Drug use: Not on file    Sexual activity: Not on file     Review of Systems   Constitutional:  Positive for activity change, fatigue and fever. Negative for chills.   HENT:  Negative for congestion, postnasal drip and sore throat.    Eyes:  Negative for photophobia.   Respiratory:  Negative for chest tightness and shortness of breath.    Cardiovascular:  Negative for chest pain.   Gastrointestinal:  Negative for abdominal distention, abdominal pain, blood in stool and vomiting.   Genitourinary:  Negative for dysuria, flank pain and hematuria.   Musculoskeletal:  Negative for back pain.   Skin:  Positive for color change, rash and wound. Negative for pallor.   Neurological:  Negative for dizziness, seizures, facial asymmetry, speech difficulty and numbness.   Hematological:  Does not bruise/bleed easily.   Psychiatric/Behavioral:  Negative for agitation and suicidal ideas. The patient is not nervous/anxious.    Objective:     Vital Signs (Most Recent):  Temp: 97.7 °F (36.5 °C) (03/24/22 0341)  Pulse: 71  (03/24/22 0600)  Resp: 18 (03/24/22 0341)  BP: 131/75 (03/24/22 0341)  SpO2: 97 % (03/24/22 0341) Vital Signs (24h Range):  Temp:  [97.6 °F (36.4 °C)-98.8 °F (37.1 °C)] 97.7 °F (36.5 °C)  Pulse:  [] 71  Resp:  [12-20] 18  SpO2:  [96 %-98 %] 97 %  BP: (113-139)/(61-75) 131/75     Weight: (!) 230.7 kg (508 lb 11.4 oz)  Body mass index is 65.31 kg/m².    Physical Exam  Vitals and nursing note reviewed.   Constitutional:       General: He is not in acute distress.     Appearance: He is well-developed. He is obese. He is not ill-appearing, toxic-appearing or diaphoretic.   HENT:      Head: Normocephalic and atraumatic.      Nose: Nose normal.   Eyes:      Extraocular Movements: EOM normal.      Pupils: Pupils are equal, round, and reactive to light.   Cardiovascular:      Rate and Rhythm: Normal rate and regular rhythm.      Heart sounds: No murmur heard.  Pulmonary:      Effort: Pulmonary effort is normal.      Breath sounds: Normal breath sounds.   Abdominal:      General: Bowel sounds are normal.      Palpations: Abdomen is soft.   Musculoskeletal:         General: Tenderness present. Normal range of motion.      Cervical back: Normal range of motion and neck supple.   Skin:     General: Skin is warm and dry.      Capillary Refill: Capillary refill takes less than 2 seconds.      Findings: Erythema present.   Neurological:      Mental Status: He is alert and oriented to person, place, and time.   Psychiatric:         Behavior: Behavior normal.         Thought Content: Thought content normal.         Judgment: Judgment normal.         CRANIAL NERVES     CN III, IV, VI   Pupils are equal, round, and reactive to light.  Extraocular motions are normal.    Below 3/22/2022            Significant Labs: All pertinent labs within the past 24 hours have been reviewed.  A1C:   Recent Labs   Lab 03/21/22  1448   HGBA1C 6.4*       ABGs: No results for input(s): PH, PCO2, HCO3, POCSATURATED, BE, TOTALHB, COHB, METHB, O2HB,  POCFIO2, PO2 in the last 48 hours.  Bilirubin:   Recent Labs   Lab 03/21/22  1100 03/22/22  0540   BILITOT 3.2* 3.0*       Blood Culture:   No results for input(s): LABBLOO in the last 48 hours.    CBC:   Recent Labs   Lab 03/23/22  0542   WBC 9.17   HGB 12.4*   HCT 39.7*          CMP:   Recent Labs   Lab 03/23/22  0542 03/24/22  0550    139   K 3.6 3.7    105   CO2 23 23   * 138*   BUN 10 10   CREATININE 0.8 0.8   CALCIUM 8.6* 8.3*   ANIONGAP 10 11   EGFRNONAA >60 >60       Cardiac Markers: No results for input(s): CKMB, MYOGLOBIN, BNP, TROPISTAT in the last 48 hours.  Lactic Acid:   No results for input(s): LACTATE in the last 48 hours.    Magnesium: No results for input(s): MG in the last 48 hours.    3/24 MG+ 2.1   POCT Glucose:   Recent Labs   Lab 03/23/22  1639 03/23/22  1935 03/24/22  0625   POCTGLUCOSE 124* 186* 125*       Troponin: No results for input(s): TROPONINI in the last 48 hours.  TSH: No results for input(s): TSH in the last 4320 hours.  Urine Culture: No results for input(s): LABURIN in the last 48 hours.  Urine Studies:   Recent Labs   Lab 03/23/22  0906   COLORU Yellow   APPEARANCEUA Clear   PHUR 6.0   SPECGRAV >=1.030*   PROTEINUA 1+*   GLUCUA Negative   KETONESU Trace*   BILIRUBINUA Negative   OCCULTUA 3+*   NITRITE Negative   UROBILINOGEN 1.0   LEUKOCYTESUR Negative   RBCUA 2   WBCUA 5   BACTERIA Moderate*   HYALINECASTS 0       Bilateral LE US-   No ultrasound evidence of lower extremity deep venous thrombosis.    Significant Imaging: I have reviewed and interpreted all pertinent imaging results/findings within the past 24 hours.    Xray - Right tib  fib-   Soft tissue swelling.  Vascular calcifications.  No evidence of a radiopaque foreign body.     There is no evidence of fracture, dislocation or other acute osseous abnormality.  CXR- 3/21/22  The cardiac silhouette is within normal limits considering portable technique.  No focal infiltrate or effusion.  No acute  osseous abnormality.

## 2022-03-24 NOTE — PLAN OF CARE
Problem: Adult Inpatient Plan of Care  Goal: Plan of Care Review  Outcome: Ongoing, Progressing     Problem: Adult Inpatient Plan of Care  Goal: Patient-Specific Goal (Individualized)  Outcome: Ongoing, Progressing     Problem: Adult Inpatient Plan of Care  Goal: Absence of Hospital-Acquired Illness or Injury  Outcome: Ongoing, Progressing     Pt admitted with RLE cellulitis.  Maintained on IV vanc and levaquin.   Vanc trough 2/26 at 0200.  R upper arm PICC placed today.  Pain is better today, no prns needed.   ACHS accuchecks, sugars today 125,112,137.   No sliding scale coverage needed.

## 2022-03-24 NOTE — PROGRESS NOTES
Pharmacokinetic Assessment Follow Up: IV Vancomycin    Vancomycin serum concentration assessment(s):    The trough level was drawn correctly and can be used to guide therapy at this time. The measurement is within the desired definitive target range of 10 to 15 mcg/mL.    Vancomycin Regimen Plan:    Continue regimen to Vancomycin 2250 mg IV every 12 hours with next serum trough concentration measured at 0200 prior to 4th dose on 3/26/22    Drug levels (last 3 results):  Recent Labs   Lab Result Units 03/22/22  2332 03/24/22  1131   Vancomycin-Trough ug/mL 8.5* 11.2       Pharmacy will continue to follow and monitor vancomycin.    Please contact pharmacy at extension [phone] for questions regarding this assessment.    Thank you for the consult,   Heather Garcia       Patient brief summary:  Luis Carlos Rueda is a 37 y.o. male initiated on antimicrobial therapy with IV Vancomycin for treatment of skin & soft tissue infection    The patient's current regimen is Vancomycin 2250 mg IV every 12 hours     Drug Allergies:   Review of patient's allergies indicates:   Allergen Reactions    Bactrim [sulfamethoxazole-trimethoprim]        Actual Body Weight:   230.7 kg    Renal Function:   Estimated Creatinine Clearance: 250.8 mL/min (based on SCr of 0.8 mg/dL).,     Dialysis Method (if applicable):  N/A    CBC (last 72 hours):  Recent Labs   Lab Result Units 03/22/22  0540 03/23/22  0542   WBC K/uL 12.42 9.17   Hemoglobin g/dL 12.4* 12.4*   Hematocrit % 40.5 39.7*   Platelets K/uL 127* 164   Gran % % 80.3* 69.2   Lymph % % 12.6* 21.0   Mono % % 6.0 6.9   Eosinophil % % 0.6 2.4   Basophil % % 0.2 0.2   Differential Method  Automated Automated       Metabolic Panel (last 72 hours):  Recent Labs   Lab Result Units 03/22/22  0540 03/23/22  0542 03/23/22  0906 03/24/22  0550   Sodium mmol/L 137 139  --  139   Potassium mmol/L 3.3* 3.6  --  3.7   Chloride mmol/L 105 106  --  105   CO2 mmol/L 21* 23  --  23   Glucose mg/dL 149* 146*  --   138*   Glucose, UA   --   --  Negative  --    BUN mg/dL 9 10  --  10   Creatinine mg/dL 0.8 0.8  --  0.8   Albumin g/dL 2.8*  --   --   --    Total Bilirubin mg/dL 3.0*  --   --   --    Alkaline Phosphatase U/L 62  --   --   --    AST U/L 22  --   --   --    ALT U/L 26  --   --   --    Magnesium mg/dL  --   --   --  2.1       Vancomycin Administrations:  vancomycin given in the last 96 hours                     vancomycin (VANCOCIN) 2,250 mg in sodium chloride 0.9% 500 mL IVPB (mg) 2,250 mg New Bag 03/24/22 1448     2,250 mg New Bag  0057     2,250 mg New Bag 03/23/22 1216     2,250 mg New Bag  0045    vancomycin 2 g in 0.9% sodium chloride 500 mL IVPB (mg) 2,000 mg New Bag 03/22/22 1300     2,000 mg New Bag  0002    vancomycin 2 g in 0.9% sodium chloride 500 mL IVPB (mg) 2,000 mg New Bag 03/21/22 1223                    Microbiologic Results:  Microbiology Results (last 7 days)       Procedure Component Value Units Date/Time    Blood culture x two cultures. Draw prior to antibiotics. [[id number]] Collected: 03/21/22 1100    Order Status: Completed Specimen: Blood from Antecubital, Right Updated: 03/23/22 1612     Blood Culture, Routine No Growth to date      No Growth to date      No Growth to date    Narrative:      Aerobic and anaerobic    Blood culture x two cultures. Draw prior to antibiotics. [[id number]] Collected: 03/21/22 1127    Order Status: Completed Specimen: Blood Updated: 03/23/22 1612     Blood Culture, Routine No Growth to date      No Growth to date      No Growth to date    Narrative:      Aerobic and anaerobic

## 2022-03-24 NOTE — PROGRESS NOTES
[place] [facility] ([facility])  [facility] Medicine  Progress Note    Patient Name: Luis Carlos Rueda  MRN: [id number]  Patient Class: IP- Inpatient   Admission Date: 3/21/2022  Length of Stay: 3 days  Attending Physician: Amy Harden MD  Primary Care Provider: Vel Yeung NP        Subjective:     Principal Problem:Cellulitis of right lower leg        HPI:  Luis Carlos Rueda is a 37 y.o. male with known Type II DM, HTN, and morbid obesity presented to the ED with his wife for right lower leg redness, pain, fever. He Noticed a small wound on the mid-anterior shin on Friday. Initially started as a fist sized redness but now spread to most of his lower leg. Works from home.. No exposure to fresh or salt water. Noted fever on [place] and presented yesterday am.   Procal 1.44 abnormal     Day 2 IV vanc and Clinda   + Fever tmax 102.7   WBC 17.50> 12.42       Overview/Hospital Course:  3/23/22 Day 3 Vanc and Clinda for Right LE cellulitis, tmax 99.5, WBC 17.50>12.42.>9.17  Temp curve and WBC better but still noting significant erythema, not much improved ; will stop clinda and add levofloxacin for gram negative coverage , Vanc trough 8.5 yesterday   -199, well controlled, BP stable with lisinopril    Bilateral LE US- negative for DVT   Using home CPAP at night   C/o pain, has pRN oxycodone, requested NSAID but will avoid to protect kidneys while getting Vanc; give kenalog 60mg IM x 1; monitor BS    3/24/22 Day 4 Vanc, day 2 levofloxacin, Renal fx stable, afebrile   Add Lovenox for DVT prophylaxis   Pt notes leg feels better than yesterday with ambulating   Blood cultures negative x 3d   Lost IV acess for 2nd time; will need at least another 72 hrs abx, will order PICC placement  Monitor renal fx with daily labs           Past Medical History:   Diagnosis Date    Diabetes mellitus     Hypertension     Psoriasis     Sleep apnea, unspecified        History reviewed. No pertinent surgical history.    Review of patient's  allergies indicates:   Allergen Reactions    Bactrim [sulfamethoxazole-trimethoprim]        No current facility-administered medications on file prior to encounter.     Current Outpatient Medications on File Prior to Encounter   Medication Sig    aspirin 325 MG tablet Take 325 mg by mouth once daily.    lisinopriL (PRINIVIL,ZESTRIL) 20 MG tablet Take 20 mg by mouth once daily.    metFORMIN (GLUCOPHAGE) 1000 MG tablet Take 1,000 mg by mouth every evening.    rosuvastatin (CRESTOR) 10 MG tablet Take 10 mg by mouth once daily.     Family History    None       Tobacco Use    Smoking status: Current Every Day Smoker     Types: Cigarettes    Smokeless tobacco: Never Used   Substance and Sexual Activity    Alcohol use: Yes    Drug use: Not on file    Sexual activity: Not on file     Review of Systems   Constitutional:  Positive for activity change, fatigue and fever. Negative for chills.   HENT:  Negative for congestion, postnasal drip and sore throat.    Eyes:  Negative for photophobia.   Respiratory:  Negative for chest tightness and shortness of breath.    Cardiovascular:  Negative for chest pain.   Gastrointestinal:  Negative for abdominal distention, abdominal pain, blood in stool and vomiting.   Genitourinary:  Negative for dysuria, flank pain and hematuria.   Musculoskeletal:  Negative for back pain.   Skin:  Positive for color change, rash and wound. Negative for pallor.   Neurological:  Negative for dizziness, seizures, facial asymmetry, speech difficulty and numbness.   Hematological:  Does not bruise/bleed easily.   Psychiatric/Behavioral:  Negative for agitation and suicidal ideas. The patient is not nervous/anxious.    Objective:     Vital Signs (Most Recent):  Temp: 97.7 °F (36.5 °C) (03/24/22 0341)  Pulse: 71 (03/24/22 0600)  Resp: 18 (03/24/22 0341)  BP: 131/75 (03/24/22 0341)  SpO2: 97 % (03/24/22 0341) Vital Signs (24h Range):  Temp:  [97.6 °F (36.4 °C)-98.8 °F (37.1 °C)] 97.7 °F (36.5  °C)  Pulse:  [] 71  Resp:  [12-20] 18  SpO2:  [96 %-98 %] 97 %  BP: (113-139)/(61-75) 131/75     Weight: (!) 230.7 kg (508 lb 11.4 oz)  Body mass index is 65.31 kg/m².    Physical Exam  Vitals and nursing note reviewed.   Constitutional:       General: He is not in acute distress.     Appearance: He is well-developed. He is obese. He is not ill-appearing, toxic-appearing or diaphoretic.   HENT:      Head: Normocephalic and atraumatic.      Nose: Nose normal.   Eyes:      Extraocular Movements: EOM normal.      Pupils: Pupils are equal, round, and reactive to light.   Cardiovascular:      Rate and Rhythm: Normal rate and regular rhythm.      Heart sounds: No murmur heard.  Pulmonary:      Effort: Pulmonary effort is normal.      Breath sounds: Normal breath sounds.   Abdominal:      General: Bowel sounds are normal.      Palpations: Abdomen is soft.   Musculoskeletal:         General: Tenderness present. Normal range of motion.      Cervical back: Normal range of motion and neck supple.   Skin:     General: Skin is warm and dry.      Capillary Refill: Capillary refill takes less than 2 seconds.      Findings: Erythema present.   Neurological:      Mental Status: He is alert and oriented to person, place, and time.   Psychiatric:         Behavior: Behavior normal.         Thought Content: Thought content normal.         Judgment: Judgment normal.         CRANIAL NERVES     CN III, IV, VI   Pupils are equal, round, and reactive to light.  Extraocular motions are normal.    Below 3/22/2022            Significant Labs: All pertinent labs within the past 24 hours have been reviewed.  A1C:   Recent Labs   Lab 03/21/22  1448   HGBA1C 6.4*       ABGs: No results for input(s): PH, PCO2, HCO3, POCSATURATED, BE, TOTALHB, COHB, METHB, O2HB, POCFIO2, PO2 in the last 48 hours.  Bilirubin:   Recent Labs   Lab 03/21/22  1100 03/22/22  0540   BILITOT 3.2* 3.0*       Blood Culture:   No results for input(s): LABBLOO in the last  48 hours.    CBC:   Recent Labs   Lab 03/23/22  0542   WBC 9.17   HGB 12.4*   HCT 39.7*          CMP:   Recent Labs   Lab 03/23/22  0542 03/24/22  0550    139   K 3.6 3.7    105   CO2 23 23   * 138*   BUN 10 10   CREATININE 0.8 0.8   CALCIUM 8.6* 8.3*   ANIONGAP 10 11   EGFRNONAA >60 >60       Cardiac Markers: No results for input(s): CKMB, MYOGLOBIN, BNP, TROPISTAT in the last 48 hours.  Lactic Acid:   No results for input(s): LACTATE in the last 48 hours.    Magnesium: No results for input(s): MG in the last 48 hours.    3/24 MG+ 2.1   POCT Glucose:   Recent Labs   Lab 03/23/22  1639 03/23/22  1935 03/24/22  0625   POCTGLUCOSE 124* 186* 125*       Troponin: No results for input(s): TROPONINI in the last 48 hours.  TSH: No results for input(s): TSH in the last 4320 hours.  Urine Culture: No results for input(s): LABURIN in the last 48 hours.  Urine Studies:   Recent Labs   Lab 03/23/22  0906   COLORU Yellow   APPEARANCEUA Clear   PHUR 6.0   SPECGRAV >=1.030*   PROTEINUA 1+*   GLUCUA Negative   KETONESU Trace*   BILIRUBINUA Negative   OCCULTUA 3+*   NITRITE Negative   UROBILINOGEN 1.0   LEUKOCYTESUR Negative   RBCUA 2   WBCUA 5   BACTERIA Moderate*   HYALINECASTS 0       Bilateral LE US-   No ultrasound evidence of lower extremity deep venous thrombosis.    Significant Imaging: I have reviewed and interpreted all pertinent imaging results/findings within the past 24 hours.    Xray - Right tib  fib-   Soft tissue swelling.  Vascular calcifications.  No evidence of a radiopaque foreign body.     There is no evidence of fracture, dislocation or other acute osseous abnormality.  CXR- 3/21/22  The cardiac silhouette is within normal limits considering portable technique.  No focal infiltrate or effusion.  No acute osseous abnormality.         Assessment/Plan:      * Cellulitis of right lower leg  Day 2 IV Vanc &  clinda ; had 3 rd dose of Clinda this am; will continue   + Fever , blood Cx  NGTd  WBC 17.50> 12.42   Bilateral LE US   Repeat labs in am .  3/23 Day 3 vanc and clinda   Temp curve and WBC better but still noting significant erythema, not much improved ; will stop clinda and add levofloxacin for gram negative coverage  3/24 Day 4 Vanc, day 2 levofloxacin       Morbid obesity    # The patient is asked to make an attempt to improve diet and exercise patterns to aid in medical management of this problem.     # Eat  5 small meals a day.     # Cut out high carbohydrate  foods : bread, rice, pasta, potatoes.     # Exercise/walk 5x/week for at least 30-45  minutes.              Primary hypertension  Continue lisinopril 20mg daily   BP stable .    Type 2 diabetes mellitus without complication, without long-term current use of insulin  Glucophage 1,000mg q hs at home  Correction scale here  Controlled   Diabetic diet .  3/24 BS stable 124-157 with correction scale       Hypokalemia  Supplement and monitor .        Obstructive sleep apnea (adult) (pediatric)  Has home machine and using at night- continue .        VTE Risk Mitigation (From admission, onward)         Ordered     enoxaparin injection 40 mg  Every 12 hours         03/24/22 0959     IP VTE LOW RISK PATIENT  Once         03/21/22 1446     Place sequential compression device  Until discontinued         03/21/22 1446     Place TATY hose  Until discontinued         03/21/22 1446                Discharge Planning   [place]:      Code Status: Full Code   Is the patient medically ready for discharge?:     Reason for patient still in hospital (select all that apply): Treatment  Discharge Plan A: Home                  Nia Verma MD  Department of Hospital Medicine   [place] [facility] ([facility])

## 2022-03-25 PROBLEM — I47.29 NSVT (NONSUSTAINED VENTRICULAR TACHYCARDIA): Status: ACTIVE | Noted: 2022-03-25

## 2022-03-25 LAB
ANION GAP SERPL CALC-SCNC: 10 MMOL/L (ref 8–16)
AORTIC ROOT ANNULUS: 3.62 CM
AV INDEX (PROSTH): 1.06
AV MEAN GRADIENT: 1 MMHG
AV PEAK GRADIENT: 3 MMHG
AV VALVE AREA: 3.29 CM2
AV VELOCITY RATIO: 0.81
BSA FOR ECHO PROCEDURE: 3.47 M2
BUN SERPL-MCNC: 8 MG/DL (ref 6–20)
CALCIUM SERPL-MCNC: 8.3 MG/DL (ref 8.7–10.5)
CHLORIDE SERPL-SCNC: 103 MMOL/L (ref 95–110)
CO2 SERPL-SCNC: 24 MMOL/L (ref 23–29)
CREAT SERPL-MCNC: 0.7 MG/DL (ref 0.5–1.4)
CV ECHO LV RWT: 0.39 CM
DOP CALC AO PEAK VEL: 0.86 M/S
DOP CALC AO VTI: 14.97 CM
DOP CALC LVOT AREA: 3.1 CM2
DOP CALC LVOT DIAMETER: 1.99 CM
DOP CALC LVOT PEAK VEL: 0.7 M/S
DOP CALC LVOT STROKE VOLUME: 49.21 CM3
DOP CALCLVOT PEAK VEL VTI: 15.83 CM
E WAVE DECELERATION TIME: 301.15 MSEC
E/A RATIO: 1.4
ECHO LV POSTERIOR WALL: 0.95 CM (ref 0.6–1.1)
EJECTION FRACTION: 55 %
EST. GFR  (AFRICAN AMERICAN): >60 ML/MIN/1.73 M^2
EST. GFR  (NON AFRICAN AMERICAN): >60 ML/MIN/1.73 M^2
FRACTIONAL SHORTENING: 29 % (ref 28–44)
GLUCOSE SERPL-MCNC: 140 MG/DL (ref 70–110)
INTERVENTRICULAR SEPTUM: 1.13 CM (ref 0.6–1.1)
IVRT: 92.27 MSEC
LEFT ATRIUM SIZE: 4.46 CM
LEFT INTERNAL DIMENSION IN SYSTOLE: 3.42 CM (ref 2.1–4)
LEFT VENTRICLE DIASTOLIC VOLUME INDEX: 33.75 ML/M2
LEFT VENTRICLE DIASTOLIC VOLUME: 109.02 ML
LEFT VENTRICLE MASS INDEX: 56 G/M2
LEFT VENTRICLE SYSTOLIC VOLUME INDEX: 14.9 ML/M2
LEFT VENTRICLE SYSTOLIC VOLUME: 48.2 ML
LEFT VENTRICULAR INTERNAL DIMENSION IN DIASTOLE: 4.83 CM (ref 3.5–6)
LEFT VENTRICULAR MASS: 181.36 G
MV PEAK A VEL: 0.7 M/S
MV PEAK E VEL: 0.98 M/S
MV STENOSIS PRESSURE HALF TIME: 87.33 MS
MV VALVE AREA P 1/2 METHOD: 2.52 CM2
PISA MRMAX VEL: 0.02 M/S
PISA TR MAX VEL: 1.81 M/S
POCT GLUCOSE: 118 MG/DL (ref 70–110)
POCT GLUCOSE: 119 MG/DL (ref 70–110)
POCT GLUCOSE: 143 MG/DL (ref 70–110)
POCT GLUCOSE: 147 MG/DL (ref 70–110)
POTASSIUM SERPL-SCNC: 3.6 MMOL/L (ref 3.5–5.1)
PULM VEIN S/D RATIO: 0.78
PV PEAK D VEL: 0.37 M/S
PV PEAK S VEL: 0.29 M/S
PV PEAK VELOCITY: 1.06 CM/S
RIGHT VENTRICULAR END-DIASTOLIC DIMENSION: 3.49 CM
SODIUM SERPL-SCNC: 137 MMOL/L (ref 136–145)
TR MAX PG: 13 MMHG

## 2022-03-25 PROCEDURE — 94761 N-INVAS EAR/PLS OXIMETRY MLT: CPT

## 2022-03-25 PROCEDURE — 36573 INSJ PICC RS&I 5 YR+: CPT | Performed by: NURSE ANESTHETIST, CERTIFIED REGISTERED

## 2022-03-25 PROCEDURE — 11000001 HC ACUTE MED/SURG PRIVATE ROOM

## 2022-03-25 PROCEDURE — 99233 PR SUBSEQUENT HOSPITAL CARE,LEVL III: ICD-10-PCS | Mod: ,,, | Performed by: FAMILY MEDICINE

## 2022-03-25 PROCEDURE — 99233 SBSQ HOSP IP/OBS HIGH 50: CPT | Mod: ,,, | Performed by: FAMILY MEDICINE

## 2022-03-25 PROCEDURE — 25000003 PHARM REV CODE 250: Performed by: STUDENT IN AN ORGANIZED HEALTH CARE EDUCATION/TRAINING PROGRAM

## 2022-03-25 PROCEDURE — 93005 ELECTROCARDIOGRAM TRACING: CPT

## 2022-03-25 PROCEDURE — 63600175 PHARM REV CODE 636 W HCPCS: Performed by: NURSE PRACTITIONER

## 2022-03-25 PROCEDURE — 25000003 PHARM REV CODE 250: Performed by: INTERNAL MEDICINE

## 2022-03-25 PROCEDURE — 63600175 PHARM REV CODE 636 W HCPCS: Performed by: INTERNAL MEDICINE

## 2022-03-25 PROCEDURE — 36569 INSJ PICC 5 YR+ W/O IMAGING: CPT | Performed by: NURSE ANESTHETIST, CERTIFIED REGISTERED

## 2022-03-25 PROCEDURE — 25000003 PHARM REV CODE 250: Performed by: NURSE PRACTITIONER

## 2022-03-25 PROCEDURE — 80048 BASIC METABOLIC PNL TOTAL CA: CPT | Performed by: NURSE PRACTITIONER

## 2022-03-25 RX ORDER — HEPARIN 100 UNIT/ML
100 SYRINGE INTRAVENOUS EVERY 8 HOURS PRN
Status: DISCONTINUED | OUTPATIENT
Start: 2022-03-25 | End: 2022-03-27 | Stop reason: HOSPADM

## 2022-03-25 RX ORDER — POTASSIUM CHLORIDE 20 MEQ/1
20 TABLET, EXTENDED RELEASE ORAL ONCE
Status: COMPLETED | OUTPATIENT
Start: 2022-03-25 | End: 2022-03-25

## 2022-03-25 RX ADMIN — ENOXAPARIN SODIUM 40 MG: 40 INJECTION SUBCUTANEOUS at 09:03

## 2022-03-25 RX ADMIN — LISINOPRIL 20 MG: 20 TABLET ORAL at 08:03

## 2022-03-25 RX ADMIN — MUPIROCIN: 20 OINTMENT TOPICAL at 11:03

## 2022-03-25 RX ADMIN — VANCOMYCIN HYDROCHLORIDE 2250 MG: 1.25 INJECTION, POWDER, LYOPHILIZED, FOR SOLUTION INTRAVENOUS at 03:03

## 2022-03-25 RX ADMIN — VANCOMYCIN HYDROCHLORIDE 2250 MG: 1.25 INJECTION, POWDER, LYOPHILIZED, FOR SOLUTION INTRAVENOUS at 04:03

## 2022-03-25 RX ADMIN — LEVOFLOXACIN 750 MG: 750 INJECTION, SOLUTION INTRAVENOUS at 10:03

## 2022-03-25 RX ADMIN — ENOXAPARIN SODIUM 40 MG: 40 INJECTION SUBCUTANEOUS at 08:03

## 2022-03-25 RX ADMIN — POTASSIUM CHLORIDE 20 MEQ: 1500 TABLET, EXTENDED RELEASE ORAL at 08:03

## 2022-03-25 RX ADMIN — MUPIROCIN: 20 OINTMENT TOPICAL at 09:03

## 2022-03-25 RX ADMIN — ATORVASTATIN CALCIUM 40 MG: 40 TABLET, FILM COATED ORAL at 08:03

## 2022-03-25 RX ADMIN — ASPIRIN 325 MG ORAL TABLET 325 MG: 325 PILL ORAL at 08:03

## 2022-03-25 NOTE — ANESTHESIA PROCEDURE NOTES
Central Line    Diagnosis: cellulitis  Patient location during procedure: done in OR  Timeout: 3/24/2022 2:00 PM  Procedure end time: 3/24/2022 2:30 PM    Staffing  Authorizing Provider: Kin Lujan CRNA  Performing Provider: Kin Lujan CRNA    Staffing  Resident/CRNA: Kin Lujan CRNA  Preanesthetic Checklist  Completed: patient identified, IV checked, site marked, risks and benefits discussed, surgical consent, monitors and equipment checked, pre-op evaluation, timeout performed and anesthesia consent given  Indication   Indication: med administration     Anesthesia   local infiltration    Central Line   Skin Prep: skin prepped with ChloraPrep, skin prep agent completely dried prior to procedure  Sterile Barriers Followed: Yes    All five maximal barriers used- gloves, gown, cap, mask, and large sterile sheet    hand hygiene performed prior to central venous catheter insertion  Location: right brachial.   Catheter type: PICC double lumen  Catheter Size: 5 Fr  Inserted Catheter Length: 60 cm  Ultrasound: vascular probe with ultrasound   Vessel Caliber: patent  Vascular Doppler:  not done, compressibility normal  Needle advanced into vessel with real time Ultrasound guidance.  Image recorded and saved.  sterile gel and probe cover used in ultrasound-guided central venous catheter insertion  Manometry: none  Insertion Attempts: 1   Securement:chlorhexidine patch, sterile dressing applied and blood return through all ports    Post-Procedure   X-Ray: no pneumothorax on x-ray, placement verified by x-ray and successful placement   Adverse Events:none      Guidewire Guidewire removed intact. Guidewire removed intact, verified with nurse.

## 2022-03-25 NOTE — ASSESSMENT & PLAN NOTE
Multiple runs NSVT over night  Cards consulted  Echo ordered  MG+ stable, K+ 3.6- will give 20meq supplement   Mg rider given last night

## 2022-03-25 NOTE — ASSESSMENT & PLAN NOTE
Day 2 IV Vanc &  clinda ; had 3 rd dose of Clinda this am; will continue   + Fever , blood Cx NGTd  WBC 17.50> 12.42   Bilateral LE US   Repeat labs in am .  3/23 Day 3 vanc and clinda   Temp curve and WBC better but still noting significant erythema, not much improved ; will stop clinda and add levofloxacin for gram negative coverage  3/24 Day 4 Vanc, day 2 levofloxacin   3/25 Day 5 Vanc, day 3 levofloxacin      25-Apr-2019 21:30

## 2022-03-25 NOTE — SUBJECTIVE & OBJECTIVE
Past Medical History:   Diagnosis Date    Diabetes mellitus     Hypertension     Psoriasis     Sleep apnea, unspecified        History reviewed. No pertinent surgical history.    Review of patient's allergies indicates:   Allergen Reactions    Bactrim [sulfamethoxazole-trimethoprim]        No current facility-administered medications on file prior to encounter.     Current Outpatient Medications on File Prior to Encounter   Medication Sig    aspirin 325 MG tablet Take 325 mg by mouth once daily.    lisinopriL (PRINIVIL,ZESTRIL) 20 MG tablet Take 20 mg by mouth once daily.    metFORMIN (GLUCOPHAGE) 1000 MG tablet Take 1,000 mg by mouth every evening.    rosuvastatin (CRESTOR) 10 MG tablet Take 10 mg by mouth once daily.     Family History    None       Tobacco Use    Smoking status: Current Every Day Smoker     Types: Cigarettes    Smokeless tobacco: Never Used   Substance and Sexual Activity    Alcohol use: Yes    Drug use: Not on file    Sexual activity: Not on file     Review of Systems   Constitutional:  Negative for activity change, chills, fatigue and fever.   HENT:  Negative for congestion, postnasal drip and sore throat.    Eyes:  Negative for photophobia.   Respiratory:  Negative for chest tightness and shortness of breath.    Cardiovascular:  Negative for chest pain.   Gastrointestinal:  Negative for abdominal distention, abdominal pain, blood in stool and vomiting.   Genitourinary:  Negative for dysuria, flank pain and hematuria.   Musculoskeletal:  Negative for back pain.   Skin:  Positive for color change, rash and wound. Negative for pallor.   Neurological:  Negative for dizziness, seizures, facial asymmetry, speech difficulty and numbness.   Hematological:  Does not bruise/bleed easily.   Psychiatric/Behavioral:  Negative for agitation and suicidal ideas. The patient is not nervous/anxious.    Objective:     Vital Signs (Most Recent):  Temp: 97.4 °F (36.3 °C) (03/25/22 0743)  Pulse: 78 (03/25/22  0743)  Resp: 18 (03/25/22 0743)  BP: 135/82 (03/25/22 0743)  SpO2: 97 % (03/25/22 0743) Vital Signs (24h Range):  Temp:  [96.6 °F (35.9 °C)-98.6 °F (37 °C)] 97.4 °F (36.3 °C)  Pulse:  [68-90] 78  Resp:  [16-18] 18  SpO2:  [96 %-100 %] 97 %  BP: (129-141)/(67-84) 135/82     Weight: (!) 230.7 kg (508 lb 11.4 oz)  Body mass index is 65.31 kg/m².    Physical Exam  Vitals and nursing note reviewed.   Constitutional:       General: He is not in acute distress.     Appearance: He is well-developed. He is obese. He is not ill-appearing, toxic-appearing or diaphoretic.   HENT:      Head: Normocephalic and atraumatic.      Nose: Nose normal.   Eyes:      Extraocular Movements: EOM normal.      Pupils: Pupils are equal, round, and reactive to light.   Cardiovascular:      Rate and Rhythm: Normal rate and regular rhythm.      Heart sounds: No murmur heard.  Pulmonary:      Effort: Pulmonary effort is normal.      Breath sounds: Normal breath sounds.   Abdominal:      General: Bowel sounds are normal.      Palpations: Abdomen is soft.   Musculoskeletal:         General: Tenderness present. Normal range of motion.      Cervical back: Normal range of motion and neck supple.   Skin:     General: Skin is warm and dry.      Capillary Refill: Capillary refill takes less than 2 seconds.      Findings: Erythema present.      Comments: improving   Neurological:      Mental Status: He is alert and oriented to person, place, and time.   Psychiatric:         Behavior: Behavior normal.         Thought Content: Thought content normal.         Judgment: Judgment normal.         CRANIAL NERVES     CN III, IV, VI   Pupils are equal, round, and reactive to light.  Extraocular motions are normal.    Below 3/22/2022              Significant Labs: All pertinent labs within the past 24 hours have been reviewed.  A1C:   Recent Labs   Lab 03/21/22  1448   HGBA1C 6.4*       ABGs: No results for input(s): PH, PCO2, HCO3, POCSATURATED, BE, TOTALHB, COHB,  METHB, O2HB, POCFIO2, PO2 in the last 48 hours.  Bilirubin:   Recent Labs   Lab 03/21/22  1100 03/22/22  0540   BILITOT 3.2* 3.0*       Blood Culture:   No results for input(s): LABBLOO in the last 48 hours.    CBC:   No results for input(s): WBC, HGB, HCT, PLT in the last 48 hours.    CMP:   Recent Labs   Lab 03/24/22  0550 03/24/22  1859 03/25/22  0600    139 137   K 3.7 3.6 3.6    104 103   CO2 23 27 24   * 143* 140*   BUN 10 10 8   CREATININE 0.8 0.8 0.7   CALCIUM 8.3* 8.8 8.3*   ANIONGAP 11 8 10   EGFRNONAA >60 >60 >60       Cardiac Markers: No results for input(s): CKMB, MYOGLOBIN, BNP, TROPISTAT in the last 48 hours.  Lactic Acid:   No results for input(s): LACTATE in the last 48 hours.    Magnesium:   Recent Labs   Lab 03/24/22  0550 03/24/22  1859   MG 2.1 2.1       3/24 MG+ 2.1   POCT Glucose:   Recent Labs   Lab 03/24/22  1635 03/24/22  1925 03/25/22  0603   POCTGLUCOSE 137* 125* 143*       Troponin: No results for input(s): TROPONINI in the last 48 hours.  TSH: No results for input(s): TSH in the last 4320 hours.  Urine Culture: No results for input(s): LABURIN in the last 48 hours.  Urine Studies:   Recent Labs   Lab 03/23/22  0906   COLORU Yellow   APPEARANCEUA Clear   PHUR 6.0   SPECGRAV >=1.030*   PROTEINUA 1+*   GLUCUA Negative   KETONESU Trace*   BILIRUBINUA Negative   OCCULTUA 3+*   NITRITE Negative   UROBILINOGEN 1.0   LEUKOCYTESUR Negative   RBCUA 2   WBCUA 5   BACTERIA Moderate*   HYALINECASTS 0         Bilateral LE US-   No ultrasound evidence of lower extremity deep venous thrombosis.    Significant Imaging: I have reviewed and interpreted all pertinent imaging results/findings within the past 24 hours.    Xray - Right tib  fib-   Soft tissue swelling.  Vascular calcifications.  No evidence of a radiopaque foreign body.     There is no evidence of fracture, dislocation or other acute osseous abnormality.  CXR- 3/21/22  The cardiac silhouette is within normal limits  considering portable technique.  No focal infiltrate or effusion.  No acute osseous abnormality.

## 2022-03-25 NOTE — PLAN OF CARE
Problem: Adult Inpatient Plan of Care  Goal: Plan of Care Review  Outcome: Ongoing, Progressing  Goal: Patient-Specific Goal (Individualized)  Outcome: Ongoing, Progressing  Goal: Absence of Hospital-Acquired Illness or Injury  Outcome: Ongoing, Progressing  Goal: Optimal Comfort and Wellbeing  Outcome: Ongoing, Progressing  Goal: Readiness for Transition of Care  Outcome: Ongoing, Progressing     Problem: Diabetes Comorbidity  Goal: Blood Glucose Level Within Targeted Range  Outcome: Ongoing, Progressing     Problem: Pain Acute  Goal: Acceptable Pain Control and Functional Ability  Outcome: Ongoing, Progressing     Problem: Fall Injury Risk  Goal: Absence of Fall and Fall-Related Injury  Outcome: Ongoing, Progressing     Problem: Infection  Goal: Absence of Infection Signs and Symptoms  Outcome: Ongoing, Progressing     Problem: Bariatric Environmental Safety  Goal: Safety Maintained with Care  Outcome: Ongoing, Progressing     Problem: Skin or Soft Tissue Infection  Goal: Absence of Infection Signs and Symptoms  Outcome: Ongoing, Progressing       Pton day 5 of vanc and day 3 of levaquin. Vanc Trough to be done 3/26/22 at 0200. Placed 20 g. Left AC due to Picc line not flushing/no blood return. Pt accucheck ac and hs. No coverage needed. Pt received CHS bath after independent shower.

## 2022-03-25 NOTE — PLAN OF CARE
Problem: Adult Inpatient Plan of Care  Goal: Plan of Care Review  Outcome: Ongoing, Progressing     Problem: Adult Inpatient Plan of Care  Goal: Patient-Specific Goal (Individualized)  Outcome: Ongoing, Progressing     Problem: Adult Inpatient Plan of Care  Goal: Absence of Hospital-Acquired Illness or Injury  Outcome: Ongoing, Progressing     Problem: Adult Inpatient Plan of Care  Goal: Optimal Comfort and Wellbeing  Outcome: Ongoing, Progressing     Problem: Diabetes Comorbidity  Goal: Blood Glucose Level Within Targeted Range  Outcome: Ongoing, Progressing

## 2022-03-25 NOTE — PROGRESS NOTES
[place] - [facility] ([facility])  [facility] Medicine  Progress Note    Patient Name: Luis Carlos Rueda  MRN: [id number]  Patient Class: IP- Inpatient   Admission Date: 3/21/2022  Length of Stay: 4 days  Attending Physician: Alejandro Hurd MD  Primary Care Provider: Vel Yeung NP        Subjective:     Principal Problem:Cellulitis of right lower leg        HPI:  Luis Carlos Rueda is a 37 y.o. male with known Type II DM, HTN, and morbid obesity presented to the ED with his wife for right lower leg redness, pain, fever. He Noticed a small wound on the mid-anterior shin on Friday. Initially started as a fist sized redness but now spread to most of his lower leg. Works from home.. No exposure to fresh or salt water. Noted fever on [place] and presented yesterday am.   Procal 1.44 abnormal     Day 2 IV vanc and Clinda   + Fever tmax 102.7   WBC 17.50> 12.42       Overview/Hospital Course:  3/23/22 Day 3 Vanc and Clinda for Right LE cellulitis, tmax 99.5, WBC 17.50>12.42.>9.17  Temp curve and WBC better but still noting significant erythema, not much improved ; will stop clinda and add levofloxacin for gram negative coverage , Vanc trough 8.5 yesterday   -199, well controlled, BP stable with lisinopril    Bilateral LE US- negative for DVT   Using home CPAP at night   C/o pain, has pRN oxycodone, requested NSAID but will avoid to protect kidneys while getting Vanc; give kenalog 60mg IM x 1; monitor BS    3/24/22 Day 4 Vanc, day 2 levofloxacin, Renal fx stable, afebrile   Add Lovenox for DVT prophylaxis   Pt notes leg feels better than yesterday with ambulating   Blood cultures negative x 3d   Lost IV acess for 2nd time; will need at least another 72 hrs abx, will order PICC placement  Monitor renal fx with daily labs     3/25 Day 5 Vanc, day 3 levofloxacin, Renal fx stable, afebrile   Right leg erythema now receding, looking better   Getting Lovenox for DVT prophylaxis , using home CPAP at night   Blood cultures negative x  4d    PICC placed yesterday with placement verified;   + NSVT over night , K+ 3.6, MG+2.1 ; VSS ; cards consulted ; Echo ordered   Monitor renal fx with daily labs           Past Medical History:   Diagnosis Date    Diabetes mellitus     Hypertension     Psoriasis     Sleep apnea, unspecified        History reviewed. No pertinent surgical history.    Review of patient's allergies indicates:   Allergen Reactions    Bactrim [sulfamethoxazole-trimethoprim]        No current facility-administered medications on file prior to encounter.     Current Outpatient Medications on File Prior to Encounter   Medication Sig    aspirin 325 MG tablet Take 325 mg by mouth once daily.    lisinopriL (PRINIVIL,ZESTRIL) 20 MG tablet Take 20 mg by mouth once daily.    metFORMIN (GLUCOPHAGE) 1000 MG tablet Take 1,000 mg by mouth every evening.    rosuvastatin (CRESTOR) 10 MG tablet Take 10 mg by mouth once daily.     Family History    None       Tobacco Use    Smoking status: Current Every Day Smoker     Types: Cigarettes    Smokeless tobacco: Never Used   Substance and Sexual Activity    Alcohol use: Yes    Drug use: Not on file    Sexual activity: Not on file     Review of Systems   Constitutional:  Negative for activity change, chills, fatigue and fever.   HENT:  Negative for congestion, postnasal drip and sore throat.    Eyes:  Negative for photophobia.   Respiratory:  Negative for chest tightness and shortness of breath.    Cardiovascular:  Negative for chest pain.   Gastrointestinal:  Negative for abdominal distention, abdominal pain, blood in stool and vomiting.   Genitourinary:  Negative for dysuria, flank pain and hematuria.   Musculoskeletal:  Negative for back pain.   Skin:  Positive for color change, rash and wound. Negative for pallor.   Neurological:  Negative for dizziness, seizures, facial asymmetry, speech difficulty and numbness.   Hematological:  Does not bruise/bleed easily.   Psychiatric/Behavioral:   Negative for agitation and suicidal ideas. The patient is not nervous/anxious.    Objective:     Vital Signs (Most Recent):  Temp: 97.4 °F (36.3 °C) (03/25/22 0743)  Pulse: 78 (03/25/22 0743)  Resp: 18 (03/25/22 0743)  BP: 135/82 (03/25/22 0743)  SpO2: 97 % (03/25/22 0743) Vital Signs (24h Range):  Temp:  [96.6 °F (35.9 °C)-98.6 °F (37 °C)] 97.4 °F (36.3 °C)  Pulse:  [68-90] 78  Resp:  [16-18] 18  SpO2:  [96 %-100 %] 97 %  BP: (129-141)/(67-84) 135/82     Weight: (!) 230.7 kg (508 lb 11.4 oz)  Body mass index is 65.31 kg/m².    Physical Exam  Vitals and nursing note reviewed.   Constitutional:       General: He is not in acute distress.     Appearance: He is well-developed. He is obese. He is not ill-appearing, toxic-appearing or diaphoretic.   HENT:      Head: Normocephalic and atraumatic.      Nose: Nose normal.   Eyes:      Extraocular Movements: EOM normal.      Pupils: Pupils are equal, round, and reactive to light.   Cardiovascular:      Rate and Rhythm: Normal rate and regular rhythm.      Heart sounds: No murmur heard.  Pulmonary:      Effort: Pulmonary effort is normal.      Breath sounds: Normal breath sounds.   Abdominal:      General: Bowel sounds are normal.      Palpations: Abdomen is soft.   Musculoskeletal:         General: Tenderness present. Normal range of motion.      Cervical back: Normal range of motion and neck supple.   Skin:     General: Skin is warm and dry.      Capillary Refill: Capillary refill takes less than 2 seconds.      Findings: Erythema present.      Comments: improving   Neurological:      Mental Status: He is alert and oriented to person, place, and time.   Psychiatric:         Behavior: Behavior normal.         Thought Content: Thought content normal.         Judgment: Judgment normal.         CRANIAL NERVES     CN III, IV, VI   Pupils are equal, round, and reactive to light.  Extraocular motions are normal.    Below 3/22/2022              Significant Labs: All pertinent labs  within the past 24 hours have been reviewed.  A1C:   Recent Labs   Lab 03/21/22  1448   HGBA1C 6.4*       ABGs: No results for input(s): PH, PCO2, HCO3, POCSATURATED, BE, TOTALHB, COHB, METHB, O2HB, POCFIO2, PO2 in the last 48 hours.  Bilirubin:   Recent Labs   Lab 03/21/22  1100 03/22/22  0540   BILITOT 3.2* 3.0*       Blood Culture:   No results for input(s): LABBLOO in the last 48 hours.    CBC:   No results for input(s): WBC, HGB, HCT, PLT in the last 48 hours.    CMP:   Recent Labs   Lab 03/24/22  0550 03/24/22  1859 03/25/22  0600    139 137   K 3.7 3.6 3.6    104 103   CO2 23 27 24   * 143* 140*   BUN 10 10 8   CREATININE 0.8 0.8 0.7   CALCIUM 8.3* 8.8 8.3*   ANIONGAP 11 8 10   EGFRNONAA >60 >60 >60       Cardiac Markers: No results for input(s): CKMB, MYOGLOBIN, BNP, TROPISTAT in the last 48 hours.  Lactic Acid:   No results for input(s): LACTATE in the last 48 hours.    Magnesium:   Recent Labs   Lab 03/24/22  0550 03/24/22  1859   MG 2.1 2.1       3/24 MG+ 2.1   POCT Glucose:   Recent Labs   Lab 03/24/22  1635 03/24/22  1925 03/25/22  0603   POCTGLUCOSE 137* 125* 143*       Troponin: No results for input(s): TROPONINI in the last 48 hours.  TSH: No results for input(s): TSH in the last 4320 hours.  Urine Culture: No results for input(s): LABURIN in the last 48 hours.  Urine Studies:   Recent Labs   Lab 03/23/22  0906   COLORU Yellow   APPEARANCEUA Clear   PHUR 6.0   SPECGRAV >=1.030*   PROTEINUA 1+*   GLUCUA Negative   KETONESU Trace*   BILIRUBINUA Negative   OCCULTUA 3+*   NITRITE Negative   UROBILINOGEN 1.0   LEUKOCYTESUR Negative   RBCUA 2   WBCUA 5   BACTERIA Moderate*   HYALINECASTS 0         Bilateral LE US-   No ultrasound evidence of lower extremity deep venous thrombosis.    Significant Imaging: I have reviewed and interpreted all pertinent imaging results/findings within the past 24 hours.    Xray - Right tib  fib-   Soft tissue swelling.  Vascular calcifications.  No evidence  of a radiopaque foreign body.     There is no evidence of fracture, dislocation or other acute osseous abnormality.  CXR- 3/21/22  The cardiac silhouette is within normal limits considering portable technique.  No focal infiltrate or effusion.  No acute osseous abnormality.         Assessment/Plan:      * Cellulitis of right lower leg  Day 2 IV Vanc &  clinda ; had 3 rd dose of Clinda this am; will continue   + Fever , blood Cx NGTd  WBC 17.50> 12.42   Bilateral LE US   Repeat labs in am .  3/23 Day 3 vanc and clinda   Temp curve and WBC better but still noting significant erythema, not much improved ; will stop clinda and add levofloxacin for gram negative coverage  3/24 Day 4 Vanc, day 2 levofloxacin   3/25 Day 5 Vanc, day 3 levofloxacin       NSVT (nonsustained ventricular tachycardia)  Multiple runs NSVT over night  Cards consulted  Echo ordered  MG+ stable, K+ 3.6- will give 20meq supplement   Mg rider given last night      Morbid obesity    # The patient is asked to make an attempt to improve diet and exercise patterns to aid in medical management of this problem.     # Eat  5 small meals a day.     # Cut out high carbohydrate  foods : bread, rice, pasta, potatoes.     # Exercise/walk 5x/week for at least 30-45  minutes.              Primary hypertension  Continue lisinopril 20mg daily   BP stable .    Type 2 diabetes mellitus without complication, without long-term current use of insulin  Glucophage 1,000mg q hs at home  Correction scale here  Controlled   Diabetic diet .  3/24 BS stable 124-157 with correction scale       Hypokalemia  Supplement and monitor .        Obstructive sleep apnea (adult) (pediatric)  Has home machine and using at night- continue .        VTE Risk Mitigation (From admission, onward)         Ordered     heparin, porcine (PF) 100 unit/mL injection flush 100 Units  Every 8 hours PRN         03/25/22 0852     enoxaparin injection 40 mg  Every 12 hours         03/24/22 0959     IP VTE  LOW RISK PATIENT  Once         03/21/22 1446     Place sequential compression device  Until discontinued         03/21/22 1446     Place TATY hose  Until discontinued         03/21/22 1446                Discharge Planning   [place]:      Code Status: Full Code   Is the patient medically ready for discharge?:     Reason for patient still in hospital (select all that apply): Treatment  Discharge Plan A: Home                  Alejandro Hurd MD  Department of Hospital Medicine   [place] [facility] [place] - [facility] - [place] ([facility])

## 2022-03-25 NOTE — CONSULTS
[place] - [facility] (3rd Fl)  Cardiology  Consult Note    Patient Name: Luis Carlos Rueda  MRN: [id number]  Admission Date: 3/21/2022  Hospital Length of Stay: 4 days  Code Status: Full Code   Consulting Provider: Anselmo Galarza NP  Primary Care Physician: Vel Yeung NP  Principal Problem:Cellulitis of right lower leg      Inpatient consult to Cardiology  Consult performed by: Bartolo Johnson MD  Consult ordered by: Kristin Amin NP        Subjective:     Chief Complaint:  RLE redness    HPI: 36 y/o male with PMHx of htn, DM, morbid obesity presents with c/o RLE redness. Pt admitted for treatment of cellulitis. WCT noted on telemetry, CIS asked to evaluate. Pt denies any complaints currently.     Past Medical History:   Diagnosis Date    Diabetes mellitus     Hypertension     Psoriasis     Sleep apnea, unspecified        History reviewed. No pertinent surgical history.    Review of patient's allergies indicates:   Allergen Reactions    Bactrim [sulfamethoxazole-trimethoprim]        No current facility-administered medications on file prior to encounter.     Current Outpatient Medications on File Prior to Encounter   Medication Sig    aspirin 325 MG tablet Take 325 mg by mouth once daily.    lisinopriL (PRINIVIL,ZESTRIL) 20 MG tablet Take 20 mg by mouth once daily.    metFORMIN (GLUCOPHAGE) 1000 MG tablet Take 1,000 mg by mouth every evening.    rosuvastatin (CRESTOR) 10 MG tablet Take 10 mg by mouth once daily.     Family History    None       Tobacco Use    Smoking status: Current Every Day Smoker     Types: Cigarettes    Smokeless tobacco: Never Used   Substance and Sexual Activity    Alcohol use: Yes    Drug use: Not on file    Sexual activity: Not on file     ROS   Constitutional:  Positive for activity change, fatigue and fever. Negative for chills.   HENT:  Negative for congestion, postnasal drip and sore throat.    Eyes:  Negative for photophobia.   Respiratory:  Negative for chest  tightness and shortness of breath.    Cardiovascular:  Negative for chest pain.   Gastrointestinal:  Negative for abdominal distention, abdominal pain, blood in stool and vomiting.   Genitourinary:  Negative for dysuria, flank pain and hematuria.   Musculoskeletal:  Negative for back pain.   Skin:  Positive for color change, rash and wound. Negative for pallor.   Neurological:  Negative for dizziness, seizures, facial asymmetry, speech difficulty and numbness.   Hematological:  Does not bruise/bleed easily.   Psychiatric/Behavioral:  Negative for agitation and suicidal ideas. The patient is not nervous/anxious.      Objective:     Vital Signs (Most Recent):  Temp: 97.4 °F (36.3 °C) (03/25/22 0743)  Pulse: 78 (03/25/22 0800)  Resp: 18 (03/25/22 0743)  BP: 135/82 (03/25/22 0743)  SpO2: 97 % (03/25/22 0743) Vital Signs (24h Range):  Temp:  [96.6 °F (35.9 °C)-98.6 °F (37 °C)] 97.4 °F (36.3 °C)  Pulse:  [68-90] 78  Resp:  [16-18] 18  SpO2:  [96 %-100 %] 97 %  BP: (129-141)/(67-84) 135/82     Weight: (!) 230.7 kg (508 lb 11.4 oz)  Body mass index is 65.31 kg/m².    SpO2: 97 %  O2 Device (Oxygen Therapy): room air      Intake/Output Summary (Last 24 hours) at 3/25/2022 0833  Last data filed at 3/25/2022 0607  Gross per 24 hour   Intake 2060.1 ml   Output --   Net 2060.1 ml       Lines/Drains/Airways     Peripherally Inserted Central Catheter Line  Duration           PICC Double Lumen 03/24/22 1600 <1 day                Physical Exam   General appearance: alert, appears stated age and cooperative, obese  Head: Normocephalic, without obvious abnormality, atraumatic  Eyes: conjunctivae/corneas clear. PERRL  Neck: no carotid bruit, no JVD and supple, symmetrical, trachea midline  Lungs: clear to auscultation bilaterally, normal respiratory effort  Chest Wall: no tenderness  Heart: regular rate and rhythm, S1, S2 normal, no murmur, click, rub or gallop  Abdomen: soft, non-tender; bowel sounds normal; no masses,  no  organomegaly  Extremities: Extremities normal, atraumatic, no cyanosis, clubbing, or edema, redness and tenderness to RLE  Pulses: Dorsalis Pedis R: 2+ (normal)/L: 2+ (normal)  Skin: Skin color, texture, turgor normal. No rashes or lesions  Neurologic: Normal mood and affect  Alert and oriented X 3    Significant Labs:   BMP:   Recent Labs   Lab 03/24/22  0550 03/24/22  1859 03/25/22  0600   * 143* 140*    139 137   K 3.7 3.6 3.6    104 103   CO2 23 27 24   BUN 10 10 8   CREATININE 0.8 0.8 0.7   CALCIUM 8.3* 8.8 8.3*   MG 2.1 2.1  --    , CMP   Recent Labs   Lab 03/24/22  0550 03/24/22  1859 03/25/22  0600    139 137   K 3.7 3.6 3.6    104 103   CO2 23 27 24   * 143* 140*   BUN 10 10 8   CREATININE 0.8 0.8 0.7   CALCIUM 8.3* 8.8 8.3*   ANIONGAP 11 8 10   ESTGFRAFRICA >60 >60 >60   EGFRNONAA >60 >60 >60   , CBC No results for input(s): WBC, HGB, HCT, PLT in the last 48 hours. and Troponin No results for input(s): TROPONINI in the last 48 hours.    Assessment and Plan:     Active Diagnoses:    Diagnosis Date Noted POA    PRINCIPAL PROBLEM:  Cellulitis of right lower leg [L03.115] 03/22/2022 Yes    NSVT (nonsustained ventricular tachycardia) [I47.2] 03/25/2022 No    Hypokalemia [E87.6] 03/22/2022 No    Type 2 diabetes mellitus without complication, without long-term current use of insulin [E11.9] 03/22/2022 Yes    Primary hypertension [I10] 03/22/2022 Yes    Morbid obesity [E66.01] 03/22/2022 Yes    Obstructive sleep apnea (adult) (pediatric) [G47.33]  Yes      Problems Resolved During this Admission:       VTE Risk Mitigation (From admission, onward)         Ordered     enoxaparin injection 40 mg  Every 12 hours         03/24/22 0959     IP VTE LOW RISK PATIENT  Once         03/21/22 1446     Place sequential compression device  Until discontinued         03/21/22 1446     Place TATY hose  Until discontinued         03/21/22 1446                DX:  RLE  cellulitis  HTN  Multiple runs of WCT probably VT; possibly from pick line in RV  Morbid Obesity with DM and NURA on CPAP    Plan: supplement K  CXR to further define pick line tip and adjust PRN  echo    Anselmo Galarza, NP scribed for Dr Johnson  Cardiology   [place] [facility] (3rd Fl)    I have personally interviewed and examined this patient face-to-face, and as the physician. Documented the above plan and rendered all medical decision making for this encounter. I have read and agree with the above documentation unless otherwise noted.

## 2022-03-26 LAB
BACTERIA BLD CULT: NORMAL
BACTERIA BLD CULT: NORMAL
BASOPHILS # BLD AUTO: 0.02 K/UL (ref 0–0.2)
BASOPHILS NFR BLD: 0.2 % (ref 0–1.9)
DIFFERENTIAL METHOD: ABNORMAL
EOSINOPHIL # BLD AUTO: 0.2 K/UL (ref 0–0.5)
EOSINOPHIL NFR BLD: 2.1 % (ref 0–8)
ERYTHROCYTE [DISTWIDTH] IN BLOOD BY AUTOMATED COUNT: 13.5 % (ref 11.5–14.5)
HCT VFR BLD AUTO: 41.6 % (ref 40–54)
HGB BLD-MCNC: 13.1 G/DL (ref 14–18)
IMM GRANULOCYTES # BLD AUTO: 0.04 K/UL (ref 0–0.04)
IMM GRANULOCYTES NFR BLD AUTO: 0.5 % (ref 0–0.5)
LYMPHOCYTES # BLD AUTO: 1.4 K/UL (ref 1–4.8)
LYMPHOCYTES NFR BLD: 17.1 % (ref 18–48)
MCH RBC QN AUTO: 27.9 PG (ref 27–31)
MCHC RBC AUTO-ENTMCNC: 31.5 G/DL (ref 32–36)
MCV RBC AUTO: 89 FL (ref 82–98)
MONOCYTES # BLD AUTO: 0.6 K/UL (ref 0.3–1)
MONOCYTES NFR BLD: 6.8 % (ref 4–15)
NEUTROPHILS # BLD AUTO: 6 K/UL (ref 1.8–7.7)
NEUTROPHILS NFR BLD: 73.3 % (ref 38–73)
NRBC BLD-RTO: 0 /100 WBC
PLATELET # BLD AUTO: 214 K/UL (ref 150–450)
PMV BLD AUTO: 10.1 FL (ref 9.2–12.9)
POCT GLUCOSE: 114 MG/DL (ref 70–110)
POCT GLUCOSE: 116 MG/DL (ref 70–110)
POCT GLUCOSE: 143 MG/DL (ref 70–110)
POCT GLUCOSE: 143 MG/DL (ref 70–110)
RBC # BLD AUTO: 4.7 M/UL (ref 4.6–6.2)
VANCOMYCIN TROUGH SERPL-MCNC: 13.8 UG/ML (ref 10–22)
WBC # BLD AUTO: 8.19 K/UL (ref 3.9–12.7)

## 2022-03-26 PROCEDURE — 63600175 PHARM REV CODE 636 W HCPCS: Performed by: INTERNAL MEDICINE

## 2022-03-26 PROCEDURE — 80202 ASSAY OF VANCOMYCIN: CPT | Performed by: INTERNAL MEDICINE

## 2022-03-26 PROCEDURE — 36415 COLL VENOUS BLD VENIPUNCTURE: CPT | Performed by: INTERNAL MEDICINE

## 2022-03-26 PROCEDURE — 94761 N-INVAS EAR/PLS OXIMETRY MLT: CPT

## 2022-03-26 PROCEDURE — 36415 COLL VENOUS BLD VENIPUNCTURE: CPT | Performed by: FAMILY MEDICINE

## 2022-03-26 PROCEDURE — 63600175 PHARM REV CODE 636 W HCPCS: Performed by: NURSE PRACTITIONER

## 2022-03-26 PROCEDURE — 25000003 PHARM REV CODE 250: Performed by: INTERNAL MEDICINE

## 2022-03-26 PROCEDURE — 11000001 HC ACUTE MED/SURG PRIVATE ROOM

## 2022-03-26 PROCEDURE — 85025 COMPLETE CBC W/AUTO DIFF WBC: CPT | Performed by: FAMILY MEDICINE

## 2022-03-26 PROCEDURE — 25000003 PHARM REV CODE 250: Performed by: STUDENT IN AN ORGANIZED HEALTH CARE EDUCATION/TRAINING PROGRAM

## 2022-03-26 PROCEDURE — 25000003 PHARM REV CODE 250: Performed by: FAMILY MEDICINE

## 2022-03-26 PROCEDURE — 99233 SBSQ HOSP IP/OBS HIGH 50: CPT | Mod: ,,, | Performed by: FAMILY MEDICINE

## 2022-03-26 PROCEDURE — 99233 PR SUBSEQUENT HOSPITAL CARE,LEVL III: ICD-10-PCS | Mod: ,,, | Performed by: FAMILY MEDICINE

## 2022-03-26 RX ORDER — SODIUM CHLORIDE 9 MG/ML
INJECTION, SOLUTION INTRAVENOUS CONTINUOUS
Status: DISCONTINUED | OUTPATIENT
Start: 2022-03-26 | End: 2022-03-27 | Stop reason: HOSPADM

## 2022-03-26 RX ORDER — LIDOCAINE 50 MG/G
1 PATCH TOPICAL
Status: DISCONTINUED | OUTPATIENT
Start: 2022-03-26 | End: 2022-03-27 | Stop reason: HOSPADM

## 2022-03-26 RX ADMIN — VANCOMYCIN HYDROCHLORIDE 2250 MG: 1.25 INJECTION, POWDER, LYOPHILIZED, FOR SOLUTION INTRAVENOUS at 03:03

## 2022-03-26 RX ADMIN — ASPIRIN 325 MG ORAL TABLET 325 MG: 325 PILL ORAL at 08:03

## 2022-03-26 RX ADMIN — OXYCODONE HYDROCHLORIDE 5 MG: 5 TABLET ORAL at 07:03

## 2022-03-26 RX ADMIN — MUPIROCIN: 20 OINTMENT TOPICAL at 10:03

## 2022-03-26 RX ADMIN — ENOXAPARIN SODIUM 40 MG: 40 INJECTION SUBCUTANEOUS at 08:03

## 2022-03-26 RX ADMIN — MUPIROCIN: 20 OINTMENT TOPICAL at 08:03

## 2022-03-26 RX ADMIN — SODIUM CHLORIDE: 0.9 INJECTION, SOLUTION INTRAVENOUS at 09:03

## 2022-03-26 RX ADMIN — ATORVASTATIN CALCIUM 40 MG: 40 TABLET, FILM COATED ORAL at 08:03

## 2022-03-26 RX ADMIN — SODIUM CHLORIDE: 0.9 INJECTION, SOLUTION INTRAVENOUS at 07:03

## 2022-03-26 RX ADMIN — ENOXAPARIN SODIUM 40 MG: 40 INJECTION SUBCUTANEOUS at 10:03

## 2022-03-26 RX ADMIN — LIDOCAINE 1 PATCH: 50 PATCH TOPICAL at 11:03

## 2022-03-26 RX ADMIN — LISINOPRIL 20 MG: 20 TABLET ORAL at 08:03

## 2022-03-26 RX ADMIN — LEVOFLOXACIN 750 MG: 750 INJECTION, SOLUTION INTRAVENOUS at 11:03

## 2022-03-26 RX ADMIN — ACETAMINOPHEN 650 MG: 325 TABLET ORAL at 05:03

## 2022-03-26 NOTE — ASSESSMENT & PLAN NOTE
Glucophage 1,000mg q hs at home  Correction scale here  Controlled   Diabetic diet .  BS stable 124-157 with correction scale

## 2022-03-26 NOTE — PROGRESS NOTES
[place] [facility] ([facility])  [facility] Medicine  Progress Note    Patient Name: Luis Carlos Rueda  MRN: [id number]  Patient Class: IP- Inpatient   Admission Date: 3/21/2022  Length of Stay: 5 days  Attending Physician: Alejandro Hurd MD  Primary Care Provider: Vel Yeung NP        Subjective:     Principal Problem:Cellulitis of right lower leg        HPI:  Luis Carlos Rueda is a 37 y.o. male with known Type II DM, HTN, and morbid obesity presented to the ED with his wife for right lower leg redness, pain, fever. He Noticed a small wound on the mid-anterior shin on Friday. Initially started as a fist sized redness but now spread to most of his lower leg. Works from home.. No exposure to fresh or salt water. Noted fever on [place] and presented yesterday am.   Procal 1.44 abnormal     Day 2 IV vanc and Clinda   + Fever tmax 102.7   WBC 17.50> 12.42       Overview/Hospital Course:  3/23/22 Day 3 Vanc and Clinda for Right LE cellulitis, tmax 99.5, WBC 17.50>12.42.>9.17  Temp curve and WBC better but still noting significant erythema, not much improved ; will stop clinda and add levofloxacin for gram negative coverage , Vanc trough 8.5 yesterday   -199, well controlled, BP stable with lisinopril    Bilateral LE US- negative for DVT   Using home CPAP at night   C/o pain, has pRN oxycodone, requested NSAID but will avoid to protect kidneys while getting Vanc; give kenalog 60mg IM x 1; monitor BS    3/24/22 Day 4 Vanc, day 2 levofloxacin, Renal fx stable, afebrile   Add Lovenox for DVT prophylaxis   Pt notes leg feels better than yesterday with ambulating   Blood cultures negative x 3d   Lost IV acess for 2nd time; will need at least another 72 hrs abx, will order PICC placement  Monitor renal fx with daily labs     3/25 Day 5 Vanc, day 3 levofloxacin, Renal fx stable, afebrile   Right leg erythema now receding, looking better   Getting Lovenox for DVT prophylaxis , using home CPAP at night   Blood cultures negative x  4d    PICC placed yesterday with placement verified;   + NSVT over night , K+ 3.6, MG+2.1 ; VSS ; cards consulted ; Echo ordered   Monitor renal fx with daily labs     3/26 Day 6 Vanc, day 4 levofloxacin, Renal fx stable, afebrile   Right leg erythema now receding, looking better.  Still with edema  Getting Lovenox for DVT prophylaxis , using home CPAP at night   Blood cultures negative x 4 d    PICC placed yesterday with placement verified;   + NSVT seems to have resolved, ; cards consulted ; Echo ordered             Past Medical History:   Diagnosis Date    Diabetes mellitus     Hypertension     Psoriasis     Sleep apnea, unspecified        History reviewed. No pertinent surgical history.    Review of patient's allergies indicates:   Allergen Reactions    Bactrim [sulfamethoxazole-trimethoprim]        No current facility-administered medications on file prior to encounter.     Current Outpatient Medications on File Prior to Encounter   Medication Sig    aspirin 325 MG tablet Take 325 mg by mouth once daily.    lisinopriL (PRINIVIL,ZESTRIL) 20 MG tablet Take 20 mg by mouth once daily.    metFORMIN (GLUCOPHAGE) 1000 MG tablet Take 1,000 mg by mouth every evening.    rosuvastatin (CRESTOR) 10 MG tablet Take 10 mg by mouth once daily.     Family History    None       Tobacco Use    Smoking status: Current Every Day Smoker     Types: Cigarettes    Smokeless tobacco: Never Used   Substance and Sexual Activity    Alcohol use: Yes    Drug use: Not on file    Sexual activity: Not on file     Review of Systems   Constitutional:  Negative for activity change, chills, fatigue and fever.   HENT:  Negative for congestion, postnasal drip and sore throat.    Eyes:  Negative for photophobia.   Respiratory:  Negative for chest tightness and shortness of breath.    Cardiovascular:  Negative for chest pain.   Gastrointestinal:  Negative for abdominal distention, abdominal pain, blood in stool and vomiting.    Genitourinary:  Negative for dysuria, flank pain and hematuria.   Musculoskeletal:  Positive for back pain.   Skin:  Positive for color change, rash and wound. Negative for pallor.   Neurological:  Negative for dizziness, seizures, facial asymmetry, speech difficulty and numbness.   Hematological:  Does not bruise/bleed easily.   Psychiatric/Behavioral:  Negative for agitation and suicidal ideas. The patient is not nervous/anxious.    Objective:     Vital Signs (Most Recent):  Temp: 96.8 °F (36 °C) (03/26/22 0703)  Pulse: 88 (03/26/22 1000)  Resp: 16 (03/26/22 0713)  BP: 133/78 (03/26/22 0703)  SpO2: 97 % (03/26/22 0800) Vital Signs (24h Range):  Temp:  [96.8 °F (36 °C)-98.8 °F (37.1 °C)] 96.8 °F (36 °C)  Pulse:  [] 88  Resp:  [12-20] 16  SpO2:  [94 %-100 %] 97 %  BP: (133-140)/(77-86) 133/78     Weight: (!) 230.4 kg (508 lb)  Body mass index is 65.22 kg/m².    Physical Exam  Vitals and nursing note reviewed.   Constitutional:       General: He is not in acute distress.     Appearance: He is well-developed. He is obese. He is not ill-appearing, toxic-appearing or diaphoretic.   HENT:      Head: Normocephalic and atraumatic.      Nose: Nose normal.   Eyes:      Extraocular Movements: EOM normal.      Pupils: Pupils are equal, round, and reactive to light.   Cardiovascular:      Rate and Rhythm: Normal rate and regular rhythm.      Heart sounds: No murmur heard.  Pulmonary:      Effort: Pulmonary effort is normal.      Breath sounds: Normal breath sounds.   Musculoskeletal:         General: Tenderness present. Normal range of motion.      Cervical back: Normal range of motion and neck supple.      Right lower leg: Edema present.   Skin:     General: Skin is warm and dry.      Capillary Refill: Capillary refill takes less than 2 seconds.      Findings: Erythema present.      Comments: improving   Neurological:      General: No focal deficit present.      Mental Status: He is alert and oriented to person, place,  and time.   Psychiatric:         Behavior: Behavior normal.         Thought Content: Thought content normal.         Judgment: Judgment normal.         CRANIAL NERVES     CN III, IV, VI   Pupils are equal, round, and reactive to light.  Extraocular motions are normal.     Significant Labs: All pertinent labs within the past 24 hours have been reviewed.  A1C:   Recent Labs   Lab 03/21/22  1448   HGBA1C 6.4*       ABGs: No results for input(s): PH, PCO2, HCO3, POCSATURATED, BE, TOTALHB, COHB, METHB, O2HB, POCFIO2, PO2 in the last 48 hours.  Bilirubin:   Recent Labs   Lab 03/21/22  1100 03/22/22  0540   BILITOT 3.2* 3.0*       Blood Culture:   No results for input(s): LABBLOO in the last 48 hours.    CBC:   Recent Labs   Lab 03/26/22  0824   WBC 8.19   HGB 13.1*   HCT 41.6          CMP:   Recent Labs   Lab 03/24/22  1859 03/25/22  0600    137   K 3.6 3.6    103   CO2 27 24   * 140*   BUN 10 8   CREATININE 0.8 0.7   CALCIUM 8.8 8.3*   ANIONGAP 8 10   EGFRNONAA >60 >60       Cardiac Markers: No results for input(s): CKMB, MYOGLOBIN, BNP, TROPISTAT in the last 48 hours.  Lactic Acid:   No results for input(s): LACTATE in the last 48 hours.    Magnesium:   Recent Labs   Lab 03/24/22  1859   MG 2.1         3/24 MG+ 2.1   POCT Glucose:   Recent Labs   Lab 03/25/22  1658 03/25/22  1905 03/26/22  0602   POCTGLUCOSE 119* 147* 143*       Troponin: No results for input(s): TROPONINI in the last 48 hours.  TSH: No results for input(s): TSH in the last 4320 hours.  Urine Culture: No results for input(s): LABURIN in the last 48 hours.  Urine Studies: No results for input(s): COLORU, APPEARANCEUA, PHUR, SPECGRAV, PROTEINUA, GLUCUA, KETONESU, BILIRUBINUA, OCCULTUA, NITRITE, UROBILINOGEN, LEUKOCYTESUR, RBCUA, WBCUA, BACTERIA, SQUAMEPITHEL, HYALINECASTS in the last 48 hours.    Invalid input(s): FRANCOIS      Bilateral LE US-   No ultrasound evidence of lower extremity deep venous thrombosis.    Significant  Imaging: I have reviewed and interpreted all pertinent imaging results/findings within the past 24 hours.    Xray - Right tib  fib-   Soft tissue swelling.  Vascular calcifications.  No evidence of a radiopaque foreign body.     There is no evidence of fracture, dislocation or other acute osseous abnormality.  CXR- 3/21/22  The cardiac silhouette is within normal limits considering portable technique.  No focal infiltrate or effusion.  No acute osseous abnormality.         Assessment/Plan:      * Cellulitis of right lower leg  Day 2 IV Vanc &  clinda ; had 3 rd dose of Clinda this am; will continue   + Fever , blood Cx NGTd  WBC 17.50> 12.42   Bilateral LE US   Repeat labs in am .  3/23 Day 3 vanc and clinda   Temp curve and WBC better but still noting significant erythema, not much improved ; will stop clinda and add levofloxacin for gram negative coverage  3/24 Day 4 Vanc, day 2 levofloxacin   3/25 Day 5 Vanc, day 3 levofloxacin   3/26 Day 6 Vanc, day 4 levofloxacin       NSVT (nonsustained ventricular tachycardia)  Multiple runs NSVT have resolved  Cards consulted  Echo ordered  MG+ stable, K+ 3.6- will give 20meq supplement   Mg rider as needed      Morbid obesity    # The patient is asked to make an attempt to improve diet and exercise patterns to aid in medical management of this problem.     # Eat  5 small meals a day.     # Cut out high carbohydrate  foods : bread, rice, pasta, potatoes.     # Exercise/walk 5x/week for at least 30-45  minutes.              Primary hypertension  Continue lisinopril 20mg daily   BP stable .    Type 2 diabetes mellitus without complication, without long-term current use of insulin  Glucophage 1,000mg q hs at home  Correction scale here  Controlled   Diabetic diet .  BS stable 124-157 with correction scale       Hypokalemia  Supplement and monitor .        Obstructive sleep apnea (adult) (pediatric)  Has home machine and using at night- continue .        VTE Risk Mitigation  (From admission, onward)         Ordered     heparin, porcine (PF) 100 unit/mL injection flush 100 Units  Every 8 hours PRN         03/25/22 0852     enoxaparin injection 40 mg  Every 12 hours         03/24/22 0959     IP VTE LOW RISK PATIENT  Once         03/21/22 1446     Place sequential compression device  Until discontinued         03/21/22 1446     Place TATY hose  Until discontinued         03/21/22 1446                Discharge Planning   [place]:      Code Status: Full Code   Is the patient medically ready for discharge?:     Reason for patient still in hospital (select all that apply): Treatment  Discharge Plan A: Home                  Alejandro Hurd MD  Department of Hospital Medicine   [place] [facility] (3rd Fl)

## 2022-03-26 NOTE — ASSESSMENT & PLAN NOTE
Day 2 IV Vanc &  clinda ; had 3 rd dose of Clinda this am; will continue   + Fever , blood Cx NGTd  WBC 17.50> 12.42   Bilateral LE US   Repeat labs in am .  3/23 Day 3 vanc and clinda   Temp curve and WBC better but still noting significant erythema, not much improved ; will stop clinda and add levofloxacin for gram negative coverage  3/24 Day 4 Vanc, day 2 levofloxacin   3/25 Day 5 Vanc, day 3 levofloxacin   3/26 Day 6 Vanc, day 4 levofloxacin

## 2022-03-26 NOTE — PROGRESS NOTES
Pharmacokinetic Assessment Follow Up: IV Vancomycin    Vancomycin serum concentration assessment(s):    The trough level was drawn correctly and can be used to guide therapy at this time. The measurement is within the desired definitive target range of 10 to 15 mcg/mL.    Vancomycin Regimen Plan:    Continue regimen to Vancomycin 2250 mg IV every 12 hours with next serum trough concentration measured at 1400 prior to 4th dose on 3/27    Drug levels (last 3 results):  Recent Labs   Lab Result Units 03/24/22  1131 03/26/22  0208   Vancomycin-Trough ug/mL 11.2 13.8       Pharmacy will continue to follow and monitor vancomycin.    Please contact pharmacy at extension [phone] for questions regarding this assessment.    Thank you for the consult,   Aminta Chavez       Patient brief summary:  Luis Carlos Rueda is a 37 y.o. male initiated on antimicrobial therapy with IV Vancomycin for treatment of skin & soft tissue infection    The patient's current regimen is 2250 mg q12h    Drug Allergies:   Review of patient's allergies indicates:   Allergen Reactions    Bactrim [sulfamethoxazole-trimethoprim]        Actual Body Weight:   230 kg    Renal Function:   Estimated Creatinine Clearance: 286.4 mL/min (based on SCr of 0.7 mg/dL).,     Dialysis Method (if applicable):  N/A    CBC (last 72 hours):  Recent Labs   Lab Result Units 03/23/22  0542   WBC K/uL 9.17   Hemoglobin g/dL 12.4*   Hematocrit % 39.7*   Platelets K/uL 164   Gran % % 69.2   Lymph % % 21.0   Mono % % 6.9   Eosinophil % % 2.4   Basophil % % 0.2   Differential Method  Automated       Metabolic Panel (last 72 hours):  Recent Labs   Lab Result Units 03/23/22  0542 03/23/22  0906 03/24/22  0550 03/24/22  1859 03/25/22  0600   Sodium mmol/L 139  --  139 139 137   Potassium mmol/L 3.6  --  3.7 3.6 3.6   Chloride mmol/L 106  --  105 104 103   CO2 mmol/L 23 -- 23 27 24   Glucose mg/dL 146*  --  138* 143* 140*   Glucose, UA   --  Negative  --   --   --    BUN mg/dL 10   --  10 10 8   Creatinine mg/dL 0.8  --  0.8 0.8 0.7   Magnesium mg/dL  --   --  2.1 2.1  --        Vancomycin Administrations:  vancomycin given in the last 96 hours                     vancomycin (VANCOCIN) 2,250 mg in sodium chloride 0.9% 500 mL IVPB (mg) 2,250 mg New Bag 03/26/22 0307      Restarted 03/25/22 1655     2,250 mg New Bag  1652     2,250 mg New Bag  0302     2,250 mg New Bag 03/24/22 1448     2,250 mg New Bag  0057     2,250 mg New Bag 03/23/22 1216     2,250 mg New Bag  0045    vancomycin 2 g in 0.9% sodium chloride 500 mL IVPB (mg) 2,000 mg New Bag 03/22/22 1300                    Microbiologic Results:  Microbiology Results (last 7 days)       Procedure Component Value Units Date/Time    Blood culture x two cultures. Draw prior to antibiotics. [[id number]] Collected: 03/21/22 1100    Order Status: Completed Specimen: Blood from Antecubital, Right Updated: 03/25/22 1612     Blood Culture, Routine No Growth to date      No Growth to date      No Growth to date      No Growth to date      No Growth to date    Narrative:      Aerobic and anaerobic    Blood culture x two cultures. Draw prior to antibiotics. [[id number]] Collected: 03/21/22 1127    Order Status: Completed Specimen: Blood Updated: 03/25/22 1612     Blood Culture, Routine No Growth to date      No Growth to date      No Growth to date      No Growth to date      No Growth to date    Narrative:      Aerobic and anaerobic

## 2022-03-26 NOTE — ASSESSMENT & PLAN NOTE
Multiple runs NSVT have resolved  Cards consulted  Echo ordered  MG+ stable, K+ 3.6- will give 20meq supplement   Mg rider as needed

## 2022-03-26 NOTE — PLAN OF CARE
Problem: Adult Inpatient Plan of Care  Goal: Plan of Care Review  Outcome: Ongoing, Progressing  Goal: Patient-Specific Goal (Individualized)  Outcome: Ongoing, Progressing  Goal: Absence of Hospital-Acquired Illness or Injury  Outcome: Ongoing, Progressing  Goal: Optimal Comfort and Wellbeing  Outcome: Ongoing, Progressing  Goal: Readiness for Transition of Care  Outcome: Ongoing, Progressing     Problem: Diabetes Comorbidity  Goal: Blood Glucose Level Within Targeted Range  Outcome: Ongoing, Progressing     Problem: Pain Acute  Goal: Acceptable Pain Control and Functional Ability  Outcome: Ongoing, Progressing     Problem: Fall Injury Risk  Goal: Absence of Fall and Fall-Related Injury  Outcome: Ongoing, Progressing     Problem: Infection  Goal: Absence of Infection Signs and Symptoms  Outcome: Ongoing, Progressing     Problem: Bariatric Environmental Safety  Goal: Safety Maintained with Care  Outcome: Ongoing, Progressing     Problem: Skin or Soft Tissue Infection  Goal: Absence of Infection Signs and Symptoms  Outcome: Ongoing, Progressing     Pt vital signs stable. Ambulated to bathroom independently. Applied lidocaine patch to lower back due to pain from the hospital bed. Day 6 of Vanc and day 4 levofloxacin IV. PICC line placement verified by Xray. purple lumen patent while Red lumen unable able to flush /blood return. Don made aware. Will continue to use PIV for IV antibiotics . Placed ace bandage wrap for compression on Right leg.

## 2022-03-26 NOTE — SUBJECTIVE & OBJECTIVE
Past Medical History:   Diagnosis Date    Diabetes mellitus     Hypertension     Psoriasis     Sleep apnea, unspecified        History reviewed. No pertinent surgical history.    Review of patient's allergies indicates:   Allergen Reactions    Bactrim [sulfamethoxazole-trimethoprim]        No current facility-administered medications on file prior to encounter.     Current Outpatient Medications on File Prior to Encounter   Medication Sig    aspirin 325 MG tablet Take 325 mg by mouth once daily.    lisinopriL (PRINIVIL,ZESTRIL) 20 MG tablet Take 20 mg by mouth once daily.    metFORMIN (GLUCOPHAGE) 1000 MG tablet Take 1,000 mg by mouth every evening.    rosuvastatin (CRESTOR) 10 MG tablet Take 10 mg by mouth once daily.     Family History    None       Tobacco Use    Smoking status: Current Every Day Smoker     Types: Cigarettes    Smokeless tobacco: Never Used   Substance and Sexual Activity    Alcohol use: Yes    Drug use: Not on file    Sexual activity: Not on file     Review of Systems   Constitutional:  Negative for activity change, chills, fatigue and fever.   HENT:  Negative for congestion, postnasal drip and sore throat.    Eyes:  Negative for photophobia.   Respiratory:  Negative for chest tightness and shortness of breath.    Cardiovascular:  Negative for chest pain.   Gastrointestinal:  Negative for abdominal distention, abdominal pain, blood in stool and vomiting.   Genitourinary:  Negative for dysuria, flank pain and hematuria.   Musculoskeletal:  Positive for back pain.   Skin:  Positive for color change, rash and wound. Negative for pallor.   Neurological:  Negative for dizziness, seizures, facial asymmetry, speech difficulty and numbness.   Hematological:  Does not bruise/bleed easily.   Psychiatric/Behavioral:  Negative for agitation and suicidal ideas. The patient is not nervous/anxious.    Objective:     Vital Signs (Most Recent):  Temp: 96.8 °F (36 °C) (03/26/22 0703)  Pulse: 88 (03/26/22  1000)  Resp: 16 (03/26/22 0713)  BP: 133/78 (03/26/22 0703)  SpO2: 97 % (03/26/22 0800) Vital Signs (24h Range):  Temp:  [96.8 °F (36 °C)-98.8 °F (37.1 °C)] 96.8 °F (36 °C)  Pulse:  [] 88  Resp:  [12-20] 16  SpO2:  [94 %-100 %] 97 %  BP: (133-140)/(77-86) 133/78     Weight: (!) 230.4 kg (508 lb)  Body mass index is 65.22 kg/m².    Physical Exam  Vitals and nursing note reviewed.   Constitutional:       General: He is not in acute distress.     Appearance: He is well-developed. He is obese. He is not ill-appearing, toxic-appearing or diaphoretic.   HENT:      Head: Normocephalic and atraumatic.      Nose: Nose normal.   Eyes:      Extraocular Movements: EOM normal.      Pupils: Pupils are equal, round, and reactive to light.   Cardiovascular:      Rate and Rhythm: Normal rate and regular rhythm.      Heart sounds: No murmur heard.  Pulmonary:      Effort: Pulmonary effort is normal.      Breath sounds: Normal breath sounds.   Musculoskeletal:         General: Tenderness present. Normal range of motion.      Cervical back: Normal range of motion and neck supple.      Right lower leg: Edema present.   Skin:     General: Skin is warm and dry.      Capillary Refill: Capillary refill takes less than 2 seconds.      Findings: Erythema present.      Comments: improving   Neurological:      General: No focal deficit present.      Mental Status: He is alert and oriented to person, place, and time.   Psychiatric:         Behavior: Behavior normal.         Thought Content: Thought content normal.         Judgment: Judgment normal.         CRANIAL NERVES     CN III, IV, VI   Pupils are equal, round, and reactive to light.  Extraocular motions are normal.     Significant Labs: All pertinent labs within the past 24 hours have been reviewed.  A1C:   Recent Labs   Lab 03/21/22  1448   HGBA1C 6.4*       ABGs: No results for input(s): PH, PCO2, HCO3, POCSATURATED, BE, TOTALHB, COHB, METHB, O2HB, POCFIO2, PO2 in the last 48  hours.  Bilirubin:   Recent Labs   Lab 03/21/22  1100 03/22/22  0540   BILITOT 3.2* 3.0*       Blood Culture:   No results for input(s): LABBLOO in the last 48 hours.    CBC:   Recent Labs   Lab 03/26/22  0824   WBC 8.19   HGB 13.1*   HCT 41.6          CMP:   Recent Labs   Lab 03/24/22  1859 03/25/22  0600    137   K 3.6 3.6    103   CO2 27 24   * 140*   BUN 10 8   CREATININE 0.8 0.7   CALCIUM 8.8 8.3*   ANIONGAP 8 10   EGFRNONAA >60 >60       Cardiac Markers: No results for input(s): CKMB, MYOGLOBIN, BNP, TROPISTAT in the last 48 hours.  Lactic Acid:   No results for input(s): LACTATE in the last 48 hours.    Magnesium:   Recent Labs   Lab 03/24/22  1859   MG 2.1         3/24 MG+ 2.1   POCT Glucose:   Recent Labs   Lab 03/25/22  1658 03/25/22  1905 03/26/22  0602   POCTGLUCOSE 119* 147* 143*       Troponin: No results for input(s): TROPONINI in the last 48 hours.  TSH: No results for input(s): TSH in the last 4320 hours.  Urine Culture: No results for input(s): LABURIN in the last 48 hours.  Urine Studies: No results for input(s): COLORU, APPEARANCEUA, PHUR, SPECGRAV, PROTEINUA, GLUCUA, KETONESU, BILIRUBINUA, OCCULTUA, NITRITE, UROBILINOGEN, LEUKOCYTESUR, RBCUA, WBCUA, BACTERIA, SQUAMEPITHEL, HYALINECASTS in the last 48 hours.    Invalid input(s): WRIGHTSUR      Bilateral LE US-   No ultrasound evidence of lower extremity deep venous thrombosis.    Significant Imaging: I have reviewed and interpreted all pertinent imaging results/findings within the past 24 hours.    Xray - Right tib  fib-   Soft tissue swelling.  Vascular calcifications.  No evidence of a radiopaque foreign body.     There is no evidence of fracture, dislocation or other acute osseous abnormality.  CXR- 3/21/22  The cardiac silhouette is within normal limits considering portable technique.  No focal infiltrate or effusion.  No acute osseous abnormality.

## 2022-03-27 VITALS
RESPIRATION RATE: 19 BRPM | OXYGEN SATURATION: 98 % | HEART RATE: 76 BPM | DIASTOLIC BLOOD PRESSURE: 90 MMHG | SYSTOLIC BLOOD PRESSURE: 143 MMHG | HEIGHT: 74 IN | TEMPERATURE: 97 F | BODY MASS INDEX: 40.43 KG/M2 | WEIGHT: 315 LBS

## 2022-03-27 LAB
ANION GAP SERPL CALC-SCNC: 11 MMOL/L (ref 8–16)
BUN SERPL-MCNC: 8 MG/DL (ref 6–20)
CALCIUM SERPL-MCNC: 8.9 MG/DL (ref 8.7–10.5)
CHLORIDE SERPL-SCNC: 103 MMOL/L (ref 95–110)
CO2 SERPL-SCNC: 26 MMOL/L (ref 23–29)
CREAT SERPL-MCNC: 0.8 MG/DL (ref 0.5–1.4)
EST. GFR  (AFRICAN AMERICAN): >60 ML/MIN/1.73 M^2
EST. GFR  (NON AFRICAN AMERICAN): >60 ML/MIN/1.73 M^2
GLUCOSE SERPL-MCNC: 124 MG/DL (ref 70–110)
POCT GLUCOSE: 115 MG/DL (ref 70–110)
POCT GLUCOSE: 125 MG/DL (ref 70–110)
POTASSIUM SERPL-SCNC: 3.9 MMOL/L (ref 3.5–5.1)
SODIUM SERPL-SCNC: 140 MMOL/L (ref 136–145)

## 2022-03-27 PROCEDURE — 99238 PR HOSPITAL DISCHARGE DAY,<30 MIN: ICD-10-PCS | Mod: ,,, | Performed by: FAMILY MEDICINE

## 2022-03-27 PROCEDURE — 63600175 PHARM REV CODE 636 W HCPCS: Performed by: NURSE PRACTITIONER

## 2022-03-27 PROCEDURE — 25000003 PHARM REV CODE 250: Performed by: STUDENT IN AN ORGANIZED HEALTH CARE EDUCATION/TRAINING PROGRAM

## 2022-03-27 PROCEDURE — 80048 BASIC METABOLIC PNL TOTAL CA: CPT | Performed by: FAMILY MEDICINE

## 2022-03-27 PROCEDURE — 25000003 PHARM REV CODE 250: Performed by: FAMILY MEDICINE

## 2022-03-27 PROCEDURE — 63600175 PHARM REV CODE 636 W HCPCS: Performed by: INTERNAL MEDICINE

## 2022-03-27 PROCEDURE — 99238 HOSP IP/OBS DSCHRG MGMT 30/<: CPT | Mod: ,,, | Performed by: FAMILY MEDICINE

## 2022-03-27 PROCEDURE — 25000003 PHARM REV CODE 250: Performed by: INTERNAL MEDICINE

## 2022-03-27 PROCEDURE — 94760 N-INVAS EAR/PLS OXIMETRY 1: CPT

## 2022-03-27 PROCEDURE — 36415 COLL VENOUS BLD VENIPUNCTURE: CPT | Performed by: FAMILY MEDICINE

## 2022-03-27 RX ORDER — LEVOFLOXACIN 750 MG/1
750 TABLET ORAL DAILY
Qty: 7 TABLET | Refills: 0 | Status: SHIPPED | OUTPATIENT
Start: 2022-03-27

## 2022-03-27 RX ADMIN — LEVOFLOXACIN 750 MG: 750 INJECTION, SOLUTION INTRAVENOUS at 11:03

## 2022-03-27 RX ADMIN — MUPIROCIN: 20 OINTMENT TOPICAL at 09:03

## 2022-03-27 RX ADMIN — ATORVASTATIN CALCIUM 40 MG: 40 TABLET, FILM COATED ORAL at 09:03

## 2022-03-27 RX ADMIN — VANCOMYCIN HYDROCHLORIDE 2250 MG: 1.25 INJECTION, POWDER, LYOPHILIZED, FOR SOLUTION INTRAVENOUS at 03:03

## 2022-03-27 RX ADMIN — LISINOPRIL 20 MG: 20 TABLET ORAL at 09:03

## 2022-03-27 RX ADMIN — ASPIRIN 325 MG ORAL TABLET 325 MG: 325 PILL ORAL at 09:03

## 2022-03-27 RX ADMIN — OXYCODONE HYDROCHLORIDE 5 MG: 5 TABLET ORAL at 06:03

## 2022-03-27 RX ADMIN — SODIUM CHLORIDE: 0.9 INJECTION, SOLUTION INTRAVENOUS at 07:03

## 2022-03-27 RX ADMIN — ENOXAPARIN SODIUM 40 MG: 40 INJECTION SUBCUTANEOUS at 09:03

## 2022-03-27 NOTE — PLAN OF CARE
[facility] - Med Surg (3rd Fl)  Discharge Final Note    Primary Care Provider: Vel Yeung NP    Expected Discharge Date: 3/27/2022    Final Discharge Note (most recent)     Final Note - 03/27/22 1033        Final Note    Assessment Type Final Discharge Note     Anticipated Discharge Disposition Home or Self Care     What phone number can be called within the next 1-3 days to see how you are doing after discharge? [phone]     Hospital Resources/Appts/Education Provided Appointments scheduled and added to AVS        Post-Acute Status    Post-Acute Authorization Other     Other Status No Post-Acute Service Needs     Discharge Delays None known at this time                 Important Message from Medicare             Contact Info     Vel Yeung NP   Specialty: Family Medicine   Relationship: PCP - General    [place] [facility]  [address]  [address]LO[address]  [place]  [place] [address]   Phone: [phone]       Next Steps: Follow up in 3 day(s)    Bartolo Johnson MD   Specialty: Cardiology    [address] [place] [address] [address]QU[address] [id number]   Phone: [phone]       Next Steps: Follow up in 1 week(s)

## 2022-03-27 NOTE — SUBJECTIVE & OBJECTIVE
Past Medical History:   Diagnosis Date    Diabetes mellitus     Hypertension     Psoriasis     Sleep apnea, unspecified        History reviewed. No pertinent surgical history.    Review of patient's allergies indicates:   Allergen Reactions    Bactrim [sulfamethoxazole-trimethoprim]        No current facility-administered medications on file prior to encounter.     Current Outpatient Medications on File Prior to Encounter   Medication Sig    aspirin 325 MG tablet Take 325 mg by mouth once daily.    lisinopriL (PRINIVIL,ZESTRIL) 20 MG tablet Take 20 mg by mouth once daily.    metFORMIN (GLUCOPHAGE) 1000 MG tablet Take 1,000 mg by mouth every evening.    rosuvastatin (CRESTOR) 10 MG tablet Take 10 mg by mouth once daily.     Family History    None       Tobacco Use    Smoking status: Current Every Day Smoker     Types: Cigarettes    Smokeless tobacco: Never Used   Substance and Sexual Activity    Alcohol use: Yes    Drug use: Not on file    Sexual activity: Not on file     Review of Systems   Constitutional:  Negative for activity change, chills, fatigue and fever.   HENT:  Negative for congestion, postnasal drip and sore throat.    Eyes:  Negative for photophobia.   Respiratory:  Negative for chest tightness and shortness of breath.    Cardiovascular:  Negative for chest pain.   Gastrointestinal:  Negative for abdominal distention, abdominal pain, blood in stool and vomiting.   Genitourinary:  Negative for dysuria, flank pain and hematuria.   Musculoskeletal:  Positive for back pain.   Skin:  Positive for color change, rash and wound. Negative for pallor.   Neurological:  Negative for dizziness, seizures, facial asymmetry, speech difficulty and numbness.   Hematological:  Does not bruise/bleed easily.   Psychiatric/Behavioral:  Negative for agitation and suicidal ideas. The patient is not nervous/anxious.    Objective:     Vital Signs (Most Recent):  Temp: 97.6 °F (36.4 °C) (03/27/22 0759)  Pulse: 69 (03/27/22  0800)  Resp: 16 (03/27/22 0759)  BP: (!) 143/91 (03/27/22 0759)  SpO2: 96 % (03/27/22 0759) Vital Signs (24h Range):  Temp:  [96.9 °F (36.1 °C)-98.6 °F (37 °C)] 97.6 °F (36.4 °C)  Pulse:  [62-95] 69  Resp:  [16-20] 16  SpO2:  [95 %-100 %] 96 %  BP: (135-144)/(77-92) 143/91     Weight: (!) 230.4 kg (508 lb)  Body mass index is 65.22 kg/m².    Physical Exam  Vitals and nursing note reviewed.   Constitutional:       General: He is not in acute distress.     Appearance: He is well-developed. He is obese. He is not ill-appearing, toxic-appearing or diaphoretic.   HENT:      Head: Normocephalic and atraumatic.      Nose: Nose normal.   Eyes:      Extraocular Movements: EOM normal.      Pupils: Pupils are equal, round, and reactive to light.   Cardiovascular:      Rate and Rhythm: Normal rate and regular rhythm.      Heart sounds: No murmur heard.  Pulmonary:      Effort: Pulmonary effort is normal.      Breath sounds: Normal breath sounds.   Musculoskeletal:         General: Tenderness present. Normal range of motion.      Cervical back: Normal range of motion and neck supple.      Right lower leg: Edema present.   Skin:     General: Skin is warm and dry.      Capillary Refill: Capillary refill takes less than 2 seconds.      Findings: Erythema present.      Comments: improving   Neurological:      General: No focal deficit present.      Mental Status: He is alert and oriented to person, place, and time.   Psychiatric:         Behavior: Behavior normal.         Thought Content: Thought content normal.         Judgment: Judgment normal.         CRANIAL NERVES     CN III, IV, VI   Pupils are equal, round, and reactive to light.  Extraocular motions are normal.     Significant Labs: All pertinent labs within the past 24 hours have been reviewed.  A1C:   Recent Labs   Lab 03/21/22  1448   HGBA1C 6.4*       ABGs: No results for input(s): PH, PCO2, HCO3, POCSATURATED, BE, TOTALHB, COHB, METHB, O2HB, POCFIO2, PO2 in the last 48  hours.  Bilirubin:   Recent Labs   Lab 03/21/22  1100 03/22/22  0540   BILITOT 3.2* 3.0*       Blood Culture:   No results for input(s): LABBLOO in the last 48 hours.    CBC:   Recent Labs   Lab 03/26/22  0824   WBC 8.19   HGB 13.1*   HCT 41.6          CMP:   Recent Labs   Lab 03/27/22  0710      K 3.9      CO2 26   *   BUN 8   CREATININE 0.8   CALCIUM 8.9   ANIONGAP 11   EGFRNONAA >60       Cardiac Markers: No results for input(s): CKMB, MYOGLOBIN, BNP, TROPISTAT in the last 48 hours.  Lactic Acid:   No results for input(s): LACTATE in the last 48 hours.    Magnesium: No results for input(s): MG in the last 48 hours.      3/24 MG+ 2.1   POCT Glucose:   Recent Labs   Lab 03/26/22  1618 03/26/22  1907 03/27/22  0605   POCTGLUCOSE 114* 143* 125*       Troponin: No results for input(s): TROPONINI in the last 48 hours.  TSH: No results for input(s): TSH in the last 4320 hours.  Urine Culture: No results for input(s): LABURIN in the last 48 hours.  Urine Studies: No results for input(s): COLORU, APPEARANCEUA, PHUR, SPECGRAV, PROTEINUA, GLUCUA, KETONESU, BILIRUBINUA, OCCULTUA, NITRITE, UROBILINOGEN, LEUKOCYTESUR, RBCUA, WBCUA, BACTERIA, SQUAMEPITHEL, HYALINECASTS in the last 48 hours.    Invalid input(s): WRIGHTSUR      Bilateral LE US-   No ultrasound evidence of lower extremity deep venous thrombosis.    Significant Imaging: I have reviewed and interpreted all pertinent imaging results/findings within the past 24 hours.    Xray - Right tib  fib-   Soft tissue swelling.  Vascular calcifications.  No evidence of a radiopaque foreign body.     There is no evidence of fracture, dislocation or other acute osseous abnormality.  CXR- 3/21/22  The cardiac silhouette is within normal limits considering portable technique.  No focal infiltrate or effusion.  No acute osseous abnormality.

## 2022-03-27 NOTE — ASSESSMENT & PLAN NOTE
Multiple runs NSVT have resolved  Cards consulted  Echo ordered  MG+ stable, K+ 3.6- will give 20meq supplement   Mg rider as needed  Echo ok  F/U with Cards in their clinic

## 2022-03-27 NOTE — PLAN OF CARE
Problem: Adult Inpatient Plan of Care  Goal: Plan of Care Review  Outcome: Met  Goal: Patient-Specific Goal (Individualized)  Outcome: Met  Goal: Absence of Hospital-Acquired Illness or Injury  Outcome: Met  Goal: Optimal Comfort and Wellbeing  Outcome: Met  Goal: Readiness for Transition of Care  Outcome: Met     Problem: Diabetes Comorbidity  Goal: Blood Glucose Level Within Targeted Range  Outcome: Met     Problem: Pain Acute  Goal: Acceptable Pain Control and Functional Ability  Outcome: Met     Went over discharge instructions. Patient verbalized understanding.

## 2022-03-27 NOTE — ASSESSMENT & PLAN NOTE
Day 2 IV Vanc &  clinda ; had 3 rd dose of Clinda this am; will continue   + Fever , blood Cx NGTd  WBC 17.50> 12.42   Bilateral LE US   Repeat labs in am .  3/23 Day 3 vanc and clinda   Temp curve and WBC better but still noting significant erythema, not much improved ; will stop clinda and add levofloxacin for gram negative coverage  3/24 Day 4 Vanc, day 2 levofloxacin   3/25 Day 5 Vanc, day 3 levofloxacin   3/26 Day 6 Vanc, day 4 levofloxacin   3/27  Completed 7 days of Vanc.  D/C home on oral Levaquin 750 mg po daily

## 2022-03-27 NOTE — DISCHARGE SUMMARY
[facility] ([facility])  [facility]  Discharge Summary      Patient Name: Luis Carlos Rueda  MRN: [id number]  Patient Class: IP- Inpatient  Admission Date: 3/21/2022  Hospital Length of Stay: 6 days  Discharge Date and Time:  03/27/2022 9:48 AM  Attending Physician: Alejandro Hurd MD   Discharging Provider: Alejandro Hurd MD  Primary Care Provider: Vel Yeung NP      HPI:   Luis Carlos Rueda is a 37 y.o. male with known Type II DM, HTN, and morbid obesity presented to the ED with his wife for right lower leg redness, pain, fever. He Noticed a small wound on the mid-anterior shin on Friday. Initially started as a fist sized redness but now spread to most of his lower leg. Works from home.. No exposure to fresh or salt water. Noted fever on [place] and presented yesterday am.   Procal 1.44 abnormal     Day 2 IV vanc and Clinda   + Fever tmax 102.7   WBC 17.50> 12.42       * No surgery found *      Hospital Course:   3/23/22 Day 3 Vanc and Clinda for Right LE cellulitis, tmax 99.5, WBC 17.50>12.42.>9.17  Temp curve and WBC better but still noting significant erythema, not much improved ; will stop clinda and add levofloxacin for gram negative coverage , Vanc trough 8.5 yesterday   -199, well controlled, BP stable with lisinopril    Bilateral LE US- negative for DVT   Using home CPAP at night   C/o pain, has pRN oxycodone, requested NSAID but will avoid to protect kidneys while getting Vanc; give kenalog 60mg IM x 1; monitor BS    3/24/22 Day 4 Vanc, day 2 levofloxacin, Renal fx stable, afebrile   Add Lovenox for DVT prophylaxis   Pt notes leg feels better than yesterday with ambulating   Blood cultures negative x 3d   Lost IV acess for 2nd time; will need at least another 72 hrs abx, will order PICC placement  Monitor renal fx with daily labs     3/25 Day 5 Vanc, day 3 levofloxacin, Renal fx stable, afebrile   Right leg erythema now receding, looking better   Getting Lovenox for DVT prophylaxis ,  "using home CPAP at night   Blood cultures negative x 4d    PICC placed yesterday with placement verified;   + NSVT over night , K+ 3.6, MG+2.1 ; VSS ; cards consulted ; Echo ordered   Monitor renal fx with daily labs     3/26 Day 6 Vanc, day 4 levofloxacin, Renal fx stable, afebrile   Right leg erythema now receding, looking better.  Still with edema  Getting Lovenox for DVT prophylaxis , using home CPAP at night   Blood cultures negative x 4 d    PICC placed yesterday with placement verified;   + NSVT seems to have resolved, ; cards consulted ; Echo ordered     3/27 Day 7 Vanc, day 5 levofloxacin, Renal fx stable, afebrile   Right leg erythema now receding, looking better.  Still with edema  Getting Lovenox for DVT prophylaxis , using home CPAP at night   Blood cultures negative    PICC placed but quickly clotted and never could be used  + NSVT seems to have resolved, ; cards consulted ; Echo with EF of 55%.  No treatment necessary  Unable to tolerate compression stockings       Goals of Care Treatment Preferences:  Code Status: Full Code      Consults:   Consults (From admission, onward)        Status Ordering Provider     Inpatient consult to Cardiology  Once        Provider:  Bartolo Johnson MD    Completed OG ALEJO     Inpatient consult to PICC team ([facility])  Once        Provider:  (Not yet assigned)    Acknowledged OG ALEJO     Pharmacy to dose Vancomycin consult  Once        Provider:  (Not yet assigned)   "And" Linked Group Details    Acknowledged ADALGISA ALCALA          * Cellulitis of right lower leg  Day 2 IV Vanc &  clinda ; had 3 rd dose of Clinda this am; will continue   + Fever , blood Cx NGTd  WBC 17.50> 12.42   Bilateral LE US   Repeat labs in am .  3/23 Day 3 vanc and clinda   Temp curve and WBC better but still noting significant erythema, not much improved ; will stop clinda and add levofloxacin for gram negative coverage  3/24 Day 4 Vanc, day 2 levofloxacin   3/25 Day 5 " Vanc, day 3 levofloxacin   3/26 Day 6 Vanc, day 4 levofloxacin   3/27  Completed 7 days of Vanc.  D/C home on oral Levaquin 750 mg po daily      NSVT (nonsustained ventricular tachycardia)  Multiple runs NSVT have resolved  Cards consulted  Echo ordered  MG+ stable, K+ 3.6- will give 20meq supplement   Mg rider as needed  Echo ok  F/U with Cards in their clinic      Morbid obesity    # The patient is asked to make an attempt to improve diet and exercise patterns to aid in medical management of this problem.     # Eat  5 small meals a day.     # Cut out high carbohydrate  foods : bread, rice, pasta, potatoes.     # Exercise/walk 5x/week for at least 30-45  minutes.              Primary hypertension  Continue lisinopril 20mg daily   BP stable .    Type 2 diabetes mellitus without complication, without long-term current use of insulin  Glucophage 1,000mg q hs at home  Correction scale here  Controlled   Diabetic diet .  BS stable 124-157 with correction scale       Hypokalemia  Supplement and monitor .        Obstructive sleep apnea (adult) (pediatric)  Has home machine and using at night- continue .        Final Active Diagnoses:    Diagnosis Date Noted POA    PRINCIPAL PROBLEM:  Cellulitis of right lower leg [L03.115] 03/22/2022 Yes    NSVT (nonsustained ventricular tachycardia) [I47.2] 03/25/2022 No    Hypokalemia [E87.6] 03/22/2022 No    Type 2 diabetes mellitus without complication, without long-term current use of insulin [E11.9] 03/22/2022 Yes    Primary hypertension [I10] 03/22/2022 Yes    Morbid obesity [E66.01] 03/22/2022 Yes    Obstructive sleep apnea (adult) (pediatric) [G47.33]  Yes      Problems Resolved During this Admission:       Discharged Condition: fair    Disposition: Home or Self Care    Follow Up:   Follow-up Information     Vel Yeung NP Follow up in 3 day(s).    Specialty: Family Medicine  Contact information:  [address] [place] [address]  3RD FLOOR  [facility]  [place]  [address]  [phone]             Bartolo Johnson MD Follow up in 1 week(s).    Specialty: Cardiology  Contact information:  [address] [place] [address] [phone]                       Patient Instructions:   No discharge procedures on file.    Significant Diagnostic Studies: see lab    Pending Diagnostic Studies:     None         Medications:  Reconciled Home Medications:      Medication List      START taking these medications    levoFLOXacin 750 MG tablet  Commonly known as: LEVAQUIN  Take 1 tablet (750 mg total) by mouth once daily.        CONTINUE taking these medications    aspirin 325 MG tablet  Take 325 mg by mouth once daily.     lisinopriL 20 MG tablet  Commonly known as: PRINIVIL,ZESTRIL  Take 20 mg by mouth once daily.     metFORMIN 1000 MG tablet  Commonly known as: GLUCOPHAGE  Take 1,000 mg by mouth every evening.     rosuvastatin 10 MG tablet  Commonly known as: CRESTOR  Take 10 mg by mouth once daily.            Indwelling Lines/Drains at time of discharge:   Lines/Drains/Airways     Peripherally Inserted Central Catheter Line  Duration           PICC Double Lumen 03/25/22 0847 2 days                Time spent on the discharge of patient: 15 minutes         Alejandro Hurd MD  Department of Hospital Medicine  [facility] - Med Surg (3rd Fl)

## 2022-03-27 NOTE — PROGRESS NOTES
Therapy with vancomycin complete and/or consult discontinued by provider.  Pharmacy will sign off, please re-consult as needed.    Thanks for the consult!     Rosa Holley, PharmD

## 2022-03-27 NOTE — PROGRESS NOTES
[place] [facility] ([facility])  [facility] Medicine  Progress Note    Patient Name: Luis Carlos Rueda  MRN: [id number]  Patient Class: IP- Inpatient   Admission Date: 3/21/2022  Length of Stay: 6 days  Attending Physician: Alejandro Hurd MD  Primary Care Provider: Vel Yeung NP        Subjective:     Principal Problem:Cellulitis of right lower leg        HPI:  Luis Carlos Rueda is a 37 y.o. male with known Type II DM, HTN, and morbid obesity presented to the ED with his wife for right lower leg redness, pain, fever. He Noticed a small wound on the mid-anterior shin on Friday. Initially started as a fist sized redness but now spread to most of his lower leg. Works from home.. No exposure to fresh or salt water. Noted fever on Sunady and presented yesterday am.   Procal 1.44 abnormal     Day 2 IV vanc and Clinda   + Fever tmax 102.7   WBC 17.50> 12.42       Overview/Hospital Course:  3/23/22 Day 3 Vanc and Clinda for Right LE cellulitis, tmax 99.5, WBC 17.50>12.42.>9.17  Temp curve and WBC better but still noting significant erythema, not much improved ; will stop clinda and add levofloxacin for gram negative coverage , Vanc trough 8.5 yesterday   -199, well controlled, BP stable with lisinopril    Bilateral LE US- negative for DVT   Using home CPAP at night   C/o pain, has pRN oxycodone, requested NSAID but will avoid to protect kidneys while getting Vanc; give kenalog 60mg IM x 1; monitor BS    3/24/22 Day 4 Vanc, day 2 levofloxacin, Renal fx stable, afebrile   Add Lovenox for DVT prophylaxis   Pt notes leg feels better than yesterday with ambulating   Blood cultures negative x 3d   Lost IV acess for 2nd time; will need at least another 72 hrs abx, will order PICC placement  Monitor renal fx with daily labs     3/25 Day 5 Vanc, day 3 levofloxacin, Renal fx stable, afebrile   Right leg erythema now receding, looking better   Getting Lovenox for DVT prophylaxis , using home CPAP at night   Blood cultures negative x  4d    PICC placed yesterday with placement verified;   + NSVT over night , K+ 3.6, MG+2.1 ; VSS ; cards consulted ; Echo ordered   Monitor renal fx with daily labs     3/26 Day 6 Vanc, day 4 levofloxacin, Renal fx stable, afebrile   Right leg erythema now receding, looking better.  Still with edema  Getting Lovenox for DVT prophylaxis , using home CPAP at night   Blood cultures negative x 4 d    PICC placed yesterday with placement verified;   + NSVT seems to have resolved, ; cards consulted ; Echo ordered     3/27 Day 7 Vanc, day 5 levofloxacin, Renal fx stable, afebrile   Right leg erythema now receding, looking better.  Still with edema  Getting Lovenox for DVT prophylaxis , using home CPAP at night   Blood cultures negative    PICC placed but quickly clotted and never could be used  + NSVT seems to have resolved, ; cards consulted ; Echo with EF of 55%.  No treatment necessary  Unable to tolerate compression stockings      Past Medical History:   Diagnosis Date    Diabetes mellitus     Hypertension     Psoriasis     Sleep apnea, unspecified        History reviewed. No pertinent surgical history.    Review of patient's allergies indicates:   Allergen Reactions    Bactrim [sulfamethoxazole-trimethoprim]        No current facility-administered medications on file prior to encounter.     Current Outpatient Medications on File Prior to Encounter   Medication Sig    aspirin 325 MG tablet Take 325 mg by mouth once daily.    lisinopriL (PRINIVIL,ZESTRIL) 20 MG tablet Take 20 mg by mouth once daily.    metFORMIN (GLUCOPHAGE) 1000 MG tablet Take 1,000 mg by mouth every evening.    rosuvastatin (CRESTOR) 10 MG tablet Take 10 mg by mouth once daily.     Family History    None       Tobacco Use    Smoking status: Current Every Day Smoker     Types: Cigarettes    Smokeless tobacco: Never Used   Substance and Sexual Activity    Alcohol use: Yes    Drug use: Not on file    Sexual activity: Not on file     Review  of Systems   Constitutional:  Negative for activity change, chills, fatigue and fever.   HENT:  Negative for congestion, postnasal drip and sore throat.    Eyes:  Negative for photophobia.   Respiratory:  Negative for chest tightness and shortness of breath.    Cardiovascular:  Negative for chest pain.   Gastrointestinal:  Negative for abdominal distention, abdominal pain, blood in stool and vomiting.   Genitourinary:  Negative for dysuria, flank pain and hematuria.   Musculoskeletal:  Positive for back pain.   Skin:  Positive for color change, rash and wound. Negative for pallor.   Neurological:  Negative for dizziness, seizures, facial asymmetry, speech difficulty and numbness.   Hematological:  Does not bruise/bleed easily.   Psychiatric/Behavioral:  Negative for agitation and suicidal ideas. The patient is not nervous/anxious.    Objective:     Vital Signs (Most Recent):  Temp: 97.6 °F (36.4 °C) (03/27/22 0759)  Pulse: 69 (03/27/22 0800)  Resp: 16 (03/27/22 0759)  BP: (!) 143/91 (03/27/22 0759)  SpO2: 96 % (03/27/22 0759) Vital Signs (24h Range):  Temp:  [96.9 °F (36.1 °C)-98.6 °F (37 °C)] 97.6 °F (36.4 °C)  Pulse:  [62-95] 69  Resp:  [16-20] 16  SpO2:  [95 %-100 %] 96 %  BP: (135-144)/(77-92) 143/91     Weight: (!) 230.4 kg (508 lb)  Body mass index is 65.22 kg/m².    Physical Exam  Vitals and nursing note reviewed.   Constitutional:       General: He is not in acute distress.     Appearance: He is well-developed. He is obese. He is not ill-appearing, toxic-appearing or diaphoretic.   HENT:      Head: Normocephalic and atraumatic.      Nose: Nose normal.   Eyes:      Extraocular Movements: EOM normal.      Pupils: Pupils are equal, round, and reactive to light.   Cardiovascular:      Rate and Rhythm: Normal rate and regular rhythm.      Heart sounds: No murmur heard.  Pulmonary:      Effort: Pulmonary effort is normal.      Breath sounds: Normal breath sounds.   Musculoskeletal:         General: Tenderness  present. Normal range of motion.      Cervical back: Normal range of motion and neck supple.      Right lower leg: Edema present.   Skin:     General: Skin is warm and dry.      Capillary Refill: Capillary refill takes less than 2 seconds.      Findings: Erythema present.      Comments: improving   Neurological:      General: No focal deficit present.      Mental Status: He is alert and oriented to person, place, and time.   Psychiatric:         Behavior: Behavior normal.         Thought Content: Thought content normal.         Judgment: Judgment normal.         CRANIAL NERVES     CN III, IV, VI   Pupils are equal, round, and reactive to light.  Extraocular motions are normal.     Significant Labs: All pertinent labs within the past 24 hours have been reviewed.  A1C:   Recent Labs   Lab 03/21/22  1448   HGBA1C 6.4*       ABGs: No results for input(s): PH, PCO2, HCO3, POCSATURATED, BE, TOTALHB, COHB, METHB, O2HB, POCFIO2, PO2 in the last 48 hours.  Bilirubin:   Recent Labs   Lab 03/21/22  1100 03/22/22  0540   BILITOT 3.2* 3.0*       Blood Culture:   No results for input(s): LABBLOO in the last 48 hours.    CBC:   Recent Labs   Lab 03/26/22  0824   WBC 8.19   HGB 13.1*   HCT 41.6          CMP:   Recent Labs   Lab 03/27/22  0710      K 3.9      CO2 26   *   BUN 8   CREATININE 0.8   CALCIUM 8.9   ANIONGAP 11   EGFRNONAA >60       Cardiac Markers: No results for input(s): CKMB, MYOGLOBIN, BNP, TROPISTAT in the last 48 hours.  Lactic Acid:   No results for input(s): LACTATE in the last 48 hours.    Magnesium: No results for input(s): MG in the last 48 hours.      3/24 MG+ 2.1   POCT Glucose:   Recent Labs   Lab 03/26/22  1618 03/26/22  1907 03/27/22  0605   POCTGLUCOSE 114* 143* 125*       Troponin: No results for input(s): TROPONINI in the last 48 hours.  TSH: No results for input(s): TSH in the last 4320 hours.  Urine Culture: No results for input(s): LABURIN in the last 48 hours.  Urine  Studies: No results for input(s): COLORU, APPEARANCEUA, PHUR, SPECGRAV, PROTEINUA, GLUCUA, KETONESU, BILIRUBINUA, OCCULTUA, NITRITE, UROBILINOGEN, LEUKOCYTESUR, RBCUA, WBCUA, BACTERIA, SQUAMEPITHEL, HYALINECASTS in the last 48 hours.    Invalid input(s): WRIGHTSUR      Bilateral LE US-   No ultrasound evidence of lower extremity deep venous thrombosis.    Significant Imaging: I have reviewed and interpreted all pertinent imaging results/findings within the past 24 hours.    Xray - Right tib  fib-   Soft tissue swelling.  Vascular calcifications.  No evidence of a radiopaque foreign body.     There is no evidence of fracture, dislocation or other acute osseous abnormality.  CXR- 3/21/22  The cardiac silhouette is within normal limits considering portable technique.  No focal infiltrate or effusion.  No acute osseous abnormality.         Assessment/Plan:      * Cellulitis of right lower leg  Day 2 IV Vanc &  clinda ; had 3 rd dose of Clinda this am; will continue   + Fever , blood Cx NGTd  WBC 17.50> 12.42   Bilateral LE US   Repeat labs in am .  3/23 Day 3 vanc and clinda   Temp curve and WBC better but still noting significant erythema, not much improved ; will stop clinda and add levofloxacin for gram negative coverage  3/24 Day 4 Vanc, day 2 levofloxacin   3/25 Day 5 Vanc, day 3 levofloxacin   3/26 Day 6 Vanc, day 4 levofloxacin   3/27  Completed 7 days of Vanc.  D/C home on oral Levaquin 750 mg po daily      NSVT (nonsustained ventricular tachycardia)  Multiple runs NSVT have resolved  Cards consulted  Echo ordered  MG+ stable, K+ 3.6- will give 20meq supplement   Mg rider as needed  Echo ok  F/U with Cards in their clinic      Morbid obesity    # The patient is asked to make an attempt to improve diet and exercise patterns to aid in medical management of this problem.     # Eat  5 small meals a day.     # Cut out high carbohydrate  foods : bread, rice, pasta, potatoes.     # Exercise/walk 5x/week for at least  30-45  minutes.              Primary hypertension  Continue lisinopril 20mg daily   BP stable .    Type 2 diabetes mellitus without complication, without long-term current use of insulin  Glucophage 1,000mg q hs at home  Correction scale here  Controlled   Diabetic diet .  BS stable 124-157 with correction scale       Hypokalemia  Supplement and monitor .        Obstructive sleep apnea (adult) (pediatric)  Has home machine and using at night- continue .        VTE Risk Mitigation (From admission, onward)         Ordered     heparin, porcine (PF) 100 unit/mL injection flush 100 Units  Every 8 hours PRN         03/25/22 0852     enoxaparin injection 40 mg  Every 12 hours         03/24/22 0959     IP VTE LOW RISK PATIENT  Once         03/21/22 1446     Place sequential compression device  Until discontinued         03/21/22 1446     Place TATY hose  Until discontinued         03/21/22 1446                Discharge Planning   [place]:      Code Status: Full Code   Is the patient medically ready for discharge?:     Reason for patient still in hospital (select all that apply): Patient trending condition  Discharge Plan A: Home                  Alejandro Hurd MD  Department of Hospital Medicine   [place] [facility] (3rd Fl)

## 2024-05-31 NOTE — HPI
Luis Carlos Rueda is a 37 y.o. male with known Type II DM, HTN, and morbid obesity presented to the ED with his wife for right lower leg redness, pain, fever. He Noticed a small wound on the mid-anterior shin on Friday. Initially started as a fist sized redness but now spread to most of his lower leg. Works from home.. No exposure to fresh or salt water. Noted fever on Sunady and presented yesterday am.   Procal 1.44 abnormal     Day 2 IV vanc and Clinda   + Fever tmax 102.7   WBC 17.50> 12.42    no